# Patient Record
Sex: MALE | Race: BLACK OR AFRICAN AMERICAN | Employment: STUDENT | URBAN - METROPOLITAN AREA
[De-identification: names, ages, dates, MRNs, and addresses within clinical notes are randomized per-mention and may not be internally consistent; named-entity substitution may affect disease eponyms.]

---
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
## 2024-01-07 NOTE — H&P
UROLOGY PRE-OPERATIVE HISTORY & PHYSICAL      Molina Davila Patient Status:  Outpatient in a Bed    2005 MRN [id number]   Location [facility] SURGERY Attending Talha Khan MD   Hosp Day # 0 PCP Mary Webber     Primary Care Provider: Mary Webber     Procedure     CYSTOSCOPY, BLADDER BIOPSY, POSSIBLE TRANSURETHRAL RESECTION BLADDER TUMOR:     History of Present Illness:          Molina Davila is a 18 year old otherwise healthy male who presents for evaluation of pain with voiding and perineal pain with LUTS. Now found to have abnormal bladder mucosa here for biopsy/resection.     Patient presented recently for evaluation of several urologic issues over the past 4 months. He reports he initially had issues with urination; noticed weak stream, straining, frequency. AUA symptom score is 19/35. No previous trauma. Around the same time has been feeling discomfort in the perineum and testicles; vague dull pain; no aggravating factors. Was seen at an ED a1 and an US was done but he is unsure of the results. He has tried NSAIDS which does help; did not try scrotal support. Also had STI testing per PCP; this was all negative per [facility] encounter on 10/12/23.   Finally, he has noticed a rash that was initially red/scaly at the left base of penis along suprapubic region and in the perineum. He was given Clotramizole per PCP; the perineal rash resolved and the suprapubic rash has improved and is now a darker pigmentation than surrounding skin but is not red or scaly- not painful.  No history of scrotal or perineal trauma.   No systemic symptoms.   PVR 20cc  UA negative      At initial visit, we recommended dermatology for his skin rash. For LUTS-vikor testing was sent and negative.  We also recommended bowel regimen.   Scrotal US 23; small cysts on epididimal heads bilaterally. Mild left varicocele.   Given his LUTS and pain, presented for cystoscopy to rule out stricture or other pathology.       Cysto; 11/16/23;   Moderately enlarged for age without signs of obstruction, mildly high bladder neck, Significant bladder neck inflammation with bullous edema- uploaded to media- extrinsic compression from bowel constipation noted throughout bladder    Cytology- negative.      CTU 11/24/23;   Essentaially normal; large amount of constipation.     He followed up shortly after. We discussed his issues with ongoing constipation- quite severe. He passed small hard stool but feels he can't empty well. DOES feel his urinary symptoms are related to this. He has been on miralax for a few days which has helped. He has not yet seen dermatology for his rash as instructed.   Overall, his voiding symptoms and perineal pain has improved significantly since his initial presentation but he still requires pain medication and feels uncomfortable.   Previously pain was severe enough that he could not work/exercise but this has improved; now able to exercise without pain. Still bothered by his symptoms.     Extensive discussion had regarding management of abnormal tissue and patients severe LUTS (although this has been improving with time). There is a chance that this could be cystitis glandularis for which treatment is generally resection.    Presents now for resection/biopsy of abnormal tissue. Case was discussed with collegeues given abnormal presentation; plan for resection and then possible antibiotics, GI, PFPT with repeat cystoscopy in the future.   Patient still needs to see GI, dermatology.   We discussed no intervention if cystoscopy now shows resolution of his inflammation after the bowel regimen.     The risks of the above procedure were discussed at length with patient and grandmother including infection, bleeding, damage to surrounding organs, need for catheter, need for further surgery.     Plan after surgery:   - See GI  - See dermatology  - Bowel regimen  - Consider seeing PFPT     UCX NEGATIVE    History:     Past  Medical History:   Diagnosis Date    Anesthesia complication     Patient reports he was awake during wisom teeth revomal    Asthma        Past Surgical History:   Procedure Laterality Date    WISDOM TEETH REMOVED         Family History   Problem Relation Age of Onset    Diabetes Mother     Diabetes Maternal Grandmother     Stroke Maternal Grandfather     Diabetes Other         Maternal Uncle,Aunt    Heart Disease Neg        Social History     Socioeconomic History    Marital status: Single   Tobacco Use    Smoking status: Never    Smokeless tobacco: Never   Vaping Use    Vaping Use: Some days    Substances: Nicotine    Devices: Disposable   Substance and Sexual Activity    Alcohol use: No     Alcohol/week: 0.0 standard drinks of alcohol    Drug use: Yes     Types: Cannabis       Medications:  Current Outpatient Medications   Medication Sig Dispense Refill    polyethylene glycol, PEG 3350, 17 g Oral Powd Pack Take 17 g by mouth daily.      Albuterol Sulfate  (90 Base) MCG/ACT Inhalation Aero Soln       tamsulosin 0.4 MG Oral Cap Take 1 capsule (0.4 mg total) by mouth every evening. 90 capsule 6       Allergies:  Allergies   Allergen Reactions    Albumin, Egg OTHER (SEE COMMENTS)     Patient unsure if true allergy, states he eats eggs regularly       Review of Systems:   A comprehensive 10-point review of systems was completed.  Pertinent positives and negatives are noted in the the HPI.    Physical Exam:   Vital Signs:  Height 5' 11\" (1.803 m), weight 135 lb (61.2 kg).     CONSTITUTIONAL: Well developed, well nourished, in no acute distress   RESPIRATORY: Normal respiratory effort  ABDOMEN: Soft, non-tender, non-distended    Laboratory Data:  Lab Results   Component Value Date    WBC 5.82 07/24/2021    HGB 16.1 07/24/2021     07/24/2021     No results found for: \"NA\", \"K\", \"CL\", \"CO2\", \"BUN\", \"CREATININE\", \"GLU\", \"GFRAA\", \"AST\", \"ALT\", \"TP\", \"ALB\", \"PHOS\", \"CA\", \"MG\"    Urinalysis Results (last three  years):  Recent Labs     10/30/23  1123 12/07/23  1022 12/30/23  1037   COLORUR  --   --  Light-Yellow   CLARITY  --   --  Clear   SPECGRAVITY 1.025 1.020 1.025   PHURINE 6.0 7.5 7.0   PROUR  --   --  Trace*   GLUUR  --   --  Normal   KETUR  --   --  Negative   BILUR  --   --  Negative   BLOODURINE  --   --  Negative   NITRITE Negative Negative Negative   UROBILINOGEN  --   --  Normal   LEUUR  --   --  Negative       Urine Culture Results (last three years):  Lab Results   Component Value Date    URINECUL No Growth 2 Days 12/30/2023       PSA:  No results found for: \"PSA\", \"PERCENTPSA\", \"PSAS\", \"PSAULTRA\"     Imaging (last three days):  No results found.     Assessment:   Molina Davila is a 18 year old y/o male who presents for the above stated procedure.       Plan:     - OR for CYSTOSCOPY, BLADDER BIOPSY, POSSIBLE TRANSURETHRAL RESECTION BLADDER TUMOR:   - NPO since midnight   - Antibiotics ordered for OR   - Informed consent obtained - risks and benefits explained, and all questions answered  - Marked appropriately     I have personally reviewed all relevant medical records, labs, and imaging.       Talha Khan MD  Staff Urologist  [facility]  Office: [phone]

## 2024-01-08 ENCOUNTER — ANESTHESIA EVENT (OUTPATIENT)
Dept: SURGERY | Facility: HOSPITAL | Age: 19
End: 2024-01-08
Payer: MEDICAID

## 2024-01-09 ENCOUNTER — APPOINTMENT (OUTPATIENT)
Dept: GENERAL RADIOLOGY | Facility: HOSPITAL | Age: 19
End: 2024-01-09
Attending: UROLOGY
Payer: MEDICAID

## 2024-01-09 ENCOUNTER — ANESTHESIA (OUTPATIENT)
Dept: SURGERY | Facility: HOSPITAL | Age: 19
End: 2024-01-09
Payer: MEDICAID

## 2024-01-09 ENCOUNTER — HOSPITAL ENCOUNTER (OUTPATIENT)
Facility: HOSPITAL | Age: 19
Discharge: HOME | End: 2024-01-09
Attending: UROLOGY | Admitting: UROLOGY
Payer: MEDICAID

## 2024-01-09 VITALS
HEIGHT: 71 IN | WEIGHT: 131.63 LBS | DIASTOLIC BLOOD PRESSURE: 68 MMHG | BODY MASS INDEX: 18.43 KG/M2 | SYSTOLIC BLOOD PRESSURE: 110 MMHG | OXYGEN SATURATION: 100 % | RESPIRATION RATE: 18 BRPM | HEART RATE: 76 BPM | TEMPERATURE: 98 F

## 2024-01-09 DIAGNOSIS — N32.9 LESION OF BLADDER: Primary | ICD-10-CM

## 2024-01-09 PROCEDURE — 0T5B8ZZ DESTRUCTION OF BLADDER, VIA NATURAL OR ARTIFICIAL OPENING ENDOSCOPIC: ICD-10-PCS | Performed by: UROLOGY

## 2024-01-09 PROCEDURE — 0TBC8ZX EXCISION OF BLADDER NECK, VIA NATURAL OR ARTIFICIAL OPENING ENDOSCOPIC, DIAGNOSTIC: ICD-10-PCS | Performed by: UROLOGY

## 2024-01-09 PROCEDURE — 52214 CYSTOSCOPY AND TREATMENT: CPT | Performed by: UROLOGY

## 2024-01-09 PROCEDURE — 0TBB8ZX EXCISION OF BLADDER, VIA NATURAL OR ARTIFICIAL OPENING ENDOSCOPIC, DIAGNOSTIC: ICD-10-PCS | Performed by: UROLOGY

## 2024-01-09 RX ORDER — LIDOCAINE HYDROCHLORIDE 20 MG/ML
JELLY TOPICAL AS NEEDED
Status: DISCONTINUED | OUTPATIENT
Start: 2024-01-09 | End: 2024-01-09 | Stop reason: HOSPADM

## 2024-01-09 RX ORDER — ACETAMINOPHEN 500 MG
1000 TABLET ORAL ONCE
Status: DISCONTINUED | OUTPATIENT
Start: 2024-01-09 | End: 2024-01-09 | Stop reason: HOSPADM

## 2024-01-09 RX ORDER — ONDANSETRON 2 MG/ML
INJECTION INTRAMUSCULAR; INTRAVENOUS AS NEEDED
Status: DISCONTINUED | OUTPATIENT
Start: 2024-01-09 | End: 2024-01-09 | Stop reason: SURG

## 2024-01-09 RX ORDER — HYDROMORPHONE HYDROCHLORIDE 1 MG/ML
0.2 INJECTION, SOLUTION INTRAMUSCULAR; INTRAVENOUS; SUBCUTANEOUS EVERY 5 MIN PRN
Status: DISCONTINUED | OUTPATIENT
Start: 2024-01-09 | End: 2024-01-09

## 2024-01-09 RX ORDER — NALOXONE HYDROCHLORIDE 0.4 MG/ML
0.08 INJECTION, SOLUTION INTRAMUSCULAR; INTRAVENOUS; SUBCUTANEOUS AS NEEDED
Status: DISCONTINUED | OUTPATIENT
Start: 2024-01-09 | End: 2024-01-09

## 2024-01-09 RX ORDER — HYDROMORPHONE HYDROCHLORIDE 1 MG/ML
0.6 INJECTION, SOLUTION INTRAMUSCULAR; INTRAVENOUS; SUBCUTANEOUS EVERY 5 MIN PRN
Status: DISCONTINUED | OUTPATIENT
Start: 2024-01-09 | End: 2024-01-09

## 2024-01-09 RX ORDER — PHENYLEPHRINE HCL 10 MG/ML
VIAL (ML) INJECTION AS NEEDED
Status: DISCONTINUED | OUTPATIENT
Start: 2024-01-09 | End: 2024-01-09 | Stop reason: SURG

## 2024-01-09 RX ORDER — MEPERIDINE HYDROCHLORIDE 25 MG/ML
INJECTION INTRAMUSCULAR; INTRAVENOUS; SUBCUTANEOUS
Status: COMPLETED
Start: 2024-01-09 | End: 2024-01-09

## 2024-01-09 RX ORDER — HYDROCODONE BITARTRATE AND ACETAMINOPHEN 10; 325 MG/1; MG/1
1 TABLET ORAL ONCE AS NEEDED
Status: COMPLETED | OUTPATIENT
Start: 2024-01-09 | End: 2024-01-09

## 2024-01-09 RX ORDER — HYDROCODONE BITARTRATE AND ACETAMINOPHEN 10; 325 MG/1; MG/1
2 TABLET ORAL ONCE AS NEEDED
Status: COMPLETED | OUTPATIENT
Start: 2024-01-09 | End: 2024-01-09

## 2024-01-09 RX ORDER — SULFAMETHOXAZOLE AND TRIMETHOPRIM 800; 160 MG/1; MG/1
1 TABLET ORAL 2 TIMES DAILY
Qty: 14 TABLET | Refills: 0 | Status: SHIPPED | OUTPATIENT
Start: 2024-01-09 | End: 2024-01-16

## 2024-01-09 RX ORDER — KETOROLAC TROMETHAMINE 30 MG/ML
INJECTION, SOLUTION INTRAMUSCULAR; INTRAVENOUS AS NEEDED
Status: DISCONTINUED | OUTPATIENT
Start: 2024-01-09 | End: 2024-01-09 | Stop reason: SURG

## 2024-01-09 RX ORDER — MEPERIDINE HYDROCHLORIDE 25 MG/ML
12.5 INJECTION INTRAMUSCULAR; INTRAVENOUS; SUBCUTANEOUS AS NEEDED
Status: DISCONTINUED | OUTPATIENT
Start: 2024-01-09 | End: 2024-01-09

## 2024-01-09 RX ORDER — ONDANSETRON 2 MG/ML
4 INJECTION INTRAMUSCULAR; INTRAVENOUS EVERY 6 HOURS PRN
Status: DISCONTINUED | OUTPATIENT
Start: 2024-01-09 | End: 2024-01-09

## 2024-01-09 RX ORDER — CEFAZOLIN SODIUM/WATER 2 G/20 ML
2 SYRINGE (ML) INTRAVENOUS ONCE
Status: COMPLETED | OUTPATIENT
Start: 2024-01-09 | End: 2024-01-09

## 2024-01-09 RX ORDER — CEFAZOLIN SODIUM/WATER 2 G/20 ML
SYRINGE (ML) INTRAVENOUS
Status: DISCONTINUED
Start: 2024-01-09 | End: 2024-01-09

## 2024-01-09 RX ORDER — HYDROMORPHONE HYDROCHLORIDE 1 MG/ML
0.4 INJECTION, SOLUTION INTRAMUSCULAR; INTRAVENOUS; SUBCUTANEOUS EVERY 5 MIN PRN
Status: DISCONTINUED | OUTPATIENT
Start: 2024-01-09 | End: 2024-01-09

## 2024-01-09 RX ORDER — OXYBUTYNIN CHLORIDE 5 MG/1
5 TABLET ORAL 3 TIMES DAILY PRN
Qty: 15 TABLET | Refills: 0 | Status: SHIPPED | OUTPATIENT
Start: 2024-01-09

## 2024-01-09 RX ORDER — PROCHLORPERAZINE EDISYLATE 5 MG/ML
5 INJECTION INTRAMUSCULAR; INTRAVENOUS EVERY 8 HOURS PRN
Status: DISCONTINUED | OUTPATIENT
Start: 2024-01-09 | End: 2024-01-09

## 2024-01-09 RX ORDER — ACETAMINOPHEN 500 MG
1000 TABLET ORAL ONCE AS NEEDED
Status: COMPLETED | OUTPATIENT
Start: 2024-01-09 | End: 2024-01-09

## 2024-01-09 RX ORDER — PHENAZOPYRIDINE HYDROCHLORIDE 100 MG/1
100 TABLET, FILM COATED ORAL 3 TIMES DAILY PRN
Qty: 10 TABLET | Refills: 0 | Status: SHIPPED | OUTPATIENT
Start: 2024-01-09

## 2024-01-09 RX ORDER — DEXAMETHASONE SODIUM PHOSPHATE 4 MG/ML
VIAL (ML) INJECTION AS NEEDED
Status: DISCONTINUED | OUTPATIENT
Start: 2024-01-09 | End: 2024-01-09 | Stop reason: SURG

## 2024-01-09 RX ORDER — SODIUM CHLORIDE, SODIUM LACTATE, POTASSIUM CHLORIDE, CALCIUM CHLORIDE 600; 310; 30; 20 MG/100ML; MG/100ML; MG/100ML; MG/100ML
INJECTION, SOLUTION INTRAVENOUS CONTINUOUS
Status: DISCONTINUED | OUTPATIENT
Start: 2024-01-09 | End: 2024-01-09

## 2024-01-09 RX ORDER — MIDAZOLAM HYDROCHLORIDE 1 MG/ML
INJECTION INTRAMUSCULAR; INTRAVENOUS AS NEEDED
Status: DISCONTINUED | OUTPATIENT
Start: 2024-01-09 | End: 2024-01-09 | Stop reason: SURG

## 2024-01-09 RX ORDER — LIDOCAINE HYDROCHLORIDE 10 MG/ML
INJECTION, SOLUTION EPIDURAL; INFILTRATION; INTRACAUDAL; PERINEURAL AS NEEDED
Status: DISCONTINUED | OUTPATIENT
Start: 2024-01-09 | End: 2024-01-09 | Stop reason: SURG

## 2024-01-09 RX ORDER — MIDAZOLAM HYDROCHLORIDE 1 MG/ML
1 INJECTION INTRAMUSCULAR; INTRAVENOUS EVERY 5 MIN PRN
Status: DISCONTINUED | OUTPATIENT
Start: 2024-01-09 | End: 2024-01-09

## 2024-01-09 RX ADMIN — SODIUM CHLORIDE, SODIUM LACTATE, POTASSIUM CHLORIDE, CALCIUM CHLORIDE: 600; 310; 30; 20 INJECTION, SOLUTION INTRAVENOUS at 10:25:00

## 2024-01-09 RX ADMIN — LIDOCAINE HYDROCHLORIDE 100 MG: 10 INJECTION, SOLUTION EPIDURAL; INFILTRATION; INTRACAUDAL; PERINEURAL at 10:27:00

## 2024-01-09 RX ADMIN — PHENYLEPHRINE HCL 100 MCG: 10 MG/ML VIAL (ML) INJECTION at 10:41:00

## 2024-01-09 RX ADMIN — KETOROLAC TROMETHAMINE 30 MG: 30 INJECTION, SOLUTION INTRAMUSCULAR; INTRAVENOUS at 10:49:00

## 2024-01-09 RX ADMIN — MIDAZOLAM HYDROCHLORIDE 2 MG: 1 INJECTION INTRAMUSCULAR; INTRAVENOUS at 10:23:00

## 2024-01-09 RX ADMIN — CEFAZOLIN SODIUM/WATER 2 G: 2 G/20 ML SYRINGE (ML) INTRAVENOUS at 10:34:00

## 2024-01-09 RX ADMIN — ONDANSETRON 4 MG: 2 INJECTION INTRAMUSCULAR; INTRAVENOUS at 10:49:00

## 2024-01-09 RX ADMIN — PHENYLEPHRINE HCL 50 MCG: 10 MG/ML VIAL (ML) INJECTION at 10:52:00

## 2024-01-09 RX ADMIN — DEXAMETHASONE SODIUM PHOSPHATE 4 MG: 4 MG/ML VIAL (ML) INJECTION at 10:35:00

## 2024-01-09 NOTE — ANESTHESIA PROCEDURE NOTES
Airway  Date/Time: 1/9/2024 10:28 AM  Urgency: elective      General Information and Staff    Patient location during procedure: OR  Anesthesiologist: Red Maurer MD  Resident/CRNA: Tigist Herbert CRNA  Performed: CRNA   Performed by: Tigist Herbert CRNA  Authorized by: Red Maurer MD      Indications and Patient Condition  Indications for airway management: anesthesia  Sedation level: deep  Preoxygenated: yes  Patient position: sniffing  Mask difficulty assessment: 1 - vent by mask    Final Airway Details  Final airway type: supraglottic airway      Successful airway: classic  Size 4       Number of attempts at approach: 1

## 2024-01-09 NOTE — DISCHARGE INSTRUCTIONS
You had a transurethral resection of a bladder mass (TURBT) in the operating room today.    Instructions:    - No heavy lifting or strenuous activity for 1 weeks. You should continue walking and light activity daily.    - Make sure to avoid constipation. The goal is for 1-2 soft bowel movements without straining, every day for the next 4 weeks. You may take an over the counter medication like Miralax to help with this. Please stop this medication if you begin having frequent loose stools (diarrhea).     - WE will arrange for your catheter removal on Friday 1/12       - Your follow-up appointment is listed below. Please contact us at [phone] if you need to change your appointment.  We will discuss the results of your biopsy at this next appoitment.     Your appointments       Date & Time Appointment Department ([place])    Jan 17, 2024  3:30 PM CST Post Op Visit with Talha Khan MD [facility] ([facility] 4)        Jan 30, 2024 11:00 AM CST Consult with Damon Armstrong MD [facility] ([facility] 4  [address] [facility] [address]  [facility] [id number] [phone] [address]  [facility] [id number] [phone]              - You may experience mild pain after the procedure for a few days.  If the pain becomes intolerable please contact our office or go to the nearest Emergency Room or Urgent Care. You should take over the counter tylenol for mild pain, and can alternate with ibuprofen (AKA motrin, advil - provided you do not have a medical condition such as stomach ulcers or kidney disease which prohibits you from taking these) if pain persists. Next ibuprofen dose on or after 4:50pm. If pain is still not  relieved by tylenol and/or ibuprofen, you may take narcotic pain medication if prescribed (typically oxycodone or tramadol). If you are taking narcotic pain medication this can make you constipated, so you should take over the counter stool softeners or miralax if prescribed.    - Warm pack or hot baths often help with discomfort after cystoscopy.    - If you take blood thinners (such as aspirin or plavix) please hold these medications until 2 days after surgery    - Take the antiiotic for 7days        - You may experience burning and frequency of urination over the next few days. This will improve after a few days if you stay well hydrated. If you were prescribed phenazopyridine (Pyridium) this may relieve urinary discomfort but you can only take this for 3 days. Pyridium will make your urine orange.     - You are likely to see some blood in your urine (pink or light red urine) that should clear up within a few days. Staying well hydrated should help this clear up. If you notice the urine stays dark red or there are multiple large blood clots despite good hydration, please call the urology clinic ([phone]).     - Try to abstain from alcohol, coffee, tea, artificial sweeteners, and spicy food for the next 48 hours as these can irritate the bladder.    - Drink 1.5 to 2 liters of fluid today (water is preferable). If you are on a fluid restriction due to other medical reasons then you need to adhere to your fluid restriction recommendations.    - If you are discharged with a Colón catheter in place, you will be scheduled to return for removal. If you were prescribed Oxybutynin on discharge (to help with bladder spasms), you should stop taking this 24 hours before your follow-up appointment. If the Colón catheter stops draining, reposition yourself and check for kinks in the tubing. If this persists and you are not seeing any urine drain, and you notice bladder discomfort please call or go to the nearest emergency  department as soon as possible.     - If you develop fevers / chills, difficulty urinating, or severe abdominal pain please call the office or go to the nearest emergency department.       Talha Khan MD  Staff Urologist  [facility]  Office: [phone]

## 2024-01-09 NOTE — ANESTHESIA POSTPROCEDURE EVALUATION
[facility]    Molina Davila Patient Status:  Outpatient in a Bed   Age/Gender 18 year old male MRN [id number]   Location [facility] POST ANESTHESIA CARE UNIT Attending Talha Khan MD   Hosp Day # 0 PCP Mary Webber       Anesthesia Post-op Note    CYSTOSCOPY, BLADDER BIOPSY    Procedure Summary       Date: 01/09/24 Room / Location:  MAIN OR 07 / [facility] MAIN OR    Anesthesia Start: 1024 Anesthesia Stop: 1150    Procedure: CYSTOSCOPY, BLADDER BIOPSY (Bladder) Diagnosis:       Lesion of bladder      (Lesion of bladder [N32.9])    Surgeons: Talha Khan MD Anesthesiologist: Red Maurer MD    Anesthesia Type: general ASA Status: 1            Anesthesia Type: general    Vitals Value Taken Time   /94 01/09/24 1146   Temp 97.8 01/09/24 1151   Pulse 93 01/09/24 1151   Resp 23 01/09/24 1151   SpO2 96 % 01/09/24 1151   Vitals shown include unfiled device data.    Patient Location: PACU    Anesthesia Type: general    Airway Patency: patent    Postop Pain Control: adequate    Mental Status: preanesthetic baseline    Nausea/Vomiting: none    Cardiopulmonary/Hydration status: stable euvolemic    Complications: no apparent anesthesia related complications    Postop vital signs: stable    Dental Exam: Unchanged from Preop    Patient to be discharged from PACU when criteria met.

## 2024-01-09 NOTE — OPERATIVE REPORT
Urology Operative Note    Attending Surgeon: Talha Khan MD    Assistant Surgeon: None    Patient Name: Molina Davila    Date of Surgery: 1/9/2024    Preoperative Diagnosis: Bladder lesions    Postoperative Diagnosis: Same    Procedure Performed:   Cystoscopy, bladder biopsies, fulguration of trigone (total are ~5cm)    Indication:    Molina Davila is a 18 year old otherwise healthy male who presents for evaluation of pain with voiding and perineal pain with LUTS. Now found to have abnormal bladder mucosa here for biopsy/resection.      Patient presented recently for evaluation of several urologic issues over the past 4 months. He reports he initially had issues with urination; noticed weak stream, straining, frequency. AUA symptom score is 19/35. No previous trauma. Around the same time has been feeling discomfort in the perineum and testicles; vague dull pain; no aggravating factors. Was seen at an ED a10/2023 and an US was done but he is unsure of the results. He has tried NSAIDS which does help; did not try scrotal support. Also had STI testing per PCP; this was all negative per [facility] encounter on 10/12/23.   Finally, he has noticed a rash that was initially red/scaly at the left base of penis along suprapubic region and in the perineum. He was given Clotramizole per PCP; the perineal rash resolved and the suprapubic rash has improved and is now a darker pigmentation than surrounding skin but is not red or scaly- not painful.  No history of scrotal or perineal trauma.   No systemic symptoms.   PVR 20cc  UA negative      At initial visit, we recommended dermatology for his skin rash. For LUTS-vikor testing was sent and negative.  We also recommended bowel regimen.   Scrotal US 11/6/23; small cysts on epididimal heads bilaterally. Mild left varicocele.   Given his LUTS and pain, presented for cystoscopy to rule out stricture or other pathology.      Cysto; 11/16/23;   Moderately enlarged for age without  signs of obstruction, mildly high bladder neck, Significant bladder neck inflammation with bullous edema- uploaded to media- extrinsic compression from bowel constipation noted throughout bladder    Cytology- negative.      CTU 11/24/23;   Essentaially normal; large amount of constipation.      He followed up shortly after. We discussed his issues with ongoing constipation- quite severe. He passed small hard stool but feels he can't empty well. DOES feel his urinary symptoms are related to this. He has been on miralax for a few days which has helped. He has not yet seen dermatology for his rash as instructed.   Overall, his voiding symptoms and perineal pain has improved significantly since his initial presentation but he still requires pain medication and feels uncomfortable.   Previously pain was severe enough that he could not work/exercise but this has improved; now able to exercise without pain. Still bothered by his symptoms.      Extensive discussion had regarding management of abnormal tissue and patients severe LUTS (although this has been improving with time). There is a chance that this could be cystitis glandularis for which treatment is generally resection.    Presents now for resection/biopsy of abnormal tissue. Case was discussed with collegeues given abnormal presentation; plan for resection and then possible antibiotics, GI, PFPT with repeat cystoscopy in the future.   Patient still needs to see GI, dermatology.   We discussed no intervention if cystoscopy now shows resolution of his inflammation after the bowel regimen.      The risks of the above procedure were discussed at length with patient and grandmother including infection, bleeding, damage to surrounding organs, need for catheter, need for further surgery.        Findings:  Narrow urethra. Prostate slightly obstructive, hypervascular. High bladder neck. Bladder with large amount of bullous edema and inflammatory changes along the bladder  neck from 4 o clock to 8 o clock; also edema at the trigone and near both ureteral orifices. Several biopsies were taken at the bladder neck and trigone with cold cup forceps.  Biopsy areas fulgurated.  All areas of inflammation in edema near the bladder neck and trigone were fulgurated.  The edema closest to the ureteral orifices was not fulgurated to ensure no ureteral orifice injury.  Prostate was quite hypervascular as well, this was fulgurated.  Hemostasis was good.  Catheter was placed due to extensive migration at the bladder neck.    Procedure:  The patient was taken to the operating room and a timeout was performed confirming the correct patient and procedure. The patient was prepped and draped in lithotomy position after undergoing general anesthesia. Pre-operative prophylactic antibiotics were given in the form of Ancef.    A 22 [place] Islander cystoscope was placed into the urethra and brought to the level of the bladder.  There was inflammatory changes and bullous edema along the bladder neck from the 4:00 to the 8 o'clock position.  There was also some inflammation at the trigone and near the bilateral ureteral orifices.  The inflammation at the bladder neck and trigone was biopsied with the flexible biopsy forceps. Several samples were taken and sent for pathology.   The Bugbee electrode was then used for hemostasis.  I also fulgurated the other areas of edema and inflammation.  Care was taken to not involve the bilateral ureteral orifices, so the edema overlying this area was not fulgurated.  All other areas of suspicion were fulgurated. The prostate had some mild oozing, spot fulguration was used to control this.   Hemostasis was excellent. The cystoscope was then removed.   A 20fr Colón catheter was placed into the bladder.     The patient was awoken from anesthesia and transferred to PACU in stable condition. The patient tolerated the procedure well. All instrument/supply counts were correct at the end of  the case.    Specimens:   Bladder lesion     Estimated Blood Loss:  5 mL    Tubes/Drains:  20fr Colón catheter    Complications:   None immediate    Condition from OR:  Stable    Plan:   - DC home  - abx x7 days  - Fu with GI and derm as scheduled  - Void trial Friday 1/12  - Path discussion next week as scheduled      Talha Khan MD  Staff Urologist  [facility]  Office: [phone]

## 2024-01-09 NOTE — ANESTHESIA PREPROCEDURE EVALUATION
PRE-OP EVALUATION    Patient Name: Molina Davila    Admit Diagnosis: Lesion of bladder [N32.9]    Pre-op Diagnosis: Lesion of bladder [N32.9]    CYSTOSCOPY, BLADDER BIOPSY, POSSIBLE TRANSURETHRAL RESECTION BLADDER TUMOR    Anesthesia Procedure: CYSTOSCOPY, BLADDER BIOPSY, POSSIBLE TRANSURETHRAL RESECTION BLADDER TUMOR    Surgeon(s) and Role:     * Talha Khan MD - Primary    Pre-op vitals reviewed.  Temp: 98.1 °F (36.7 °C)  Pulse: 79  Resp: 18  BP: 120/66  SpO2: 100 %  Body mass index is 18.35 kg/m².    Current medications reviewed.  Hospital Medications:   acetaminophen (Tylenol Extra Strength) tab 1,000 mg  1,000 mg Oral Once    lactated ringers infusion   Intravenous Continuous    ceFAZolin (Ancef) 2 g in 20mL IV syringe premix  2 g Intravenous Once    ceFAZolin (Ancef) 2 g/20mL IV syringe premix           Outpatient Medications:     Facility-Administered Medications Prior to Admission   Medication Dose Route Frequency Provider Last Rate Last Admin    [COMPLETED] ciprofloxacin (Cipro) tab 500 mg  500 mg Oral Once Talha Khan MD   500 mg at 11/16/23 1045     Medications Prior to Admission   Medication Sig Dispense Refill Last Dose    polyethylene glycol, PEG 3350, 17 g Oral Powd Pack Take 17 g by mouth daily.       Albuterol Sulfate  (90 Base) MCG/ACT Inhalation Aero Soln        tamsulosin 0.4 MG Oral Cap Take 1 capsule (0.4 mg total) by mouth every evening. 90 capsule 6 11/29/2023       Allergies: Albumin, egg      Anesthesia Evaluation    Patient summary reviewed.    Anesthetic Complications  (-) history of anesthetic complications         GI/Hepatic/Renal  Comment: Dysuria who presents for cystoscopy                               Cardiovascular                                                       Endo/Other    Negative endo/other ROS.                              Pulmonary  Comment: No recent asthma problems  not taking any meds for asthma    (+) asthma                      Neuro/Psych  Comment: ADHD  Negative neuro/psych ROS.                                  Past Surgical History:   Procedure Laterality Date    WISDOM TEETH REMOVED       Social History     Socioeconomic History    Marital status: Single   Tobacco Use    Smoking status: Never    Smokeless tobacco: Never   Vaping Use    Vaping Use: Some days    Substances: Nicotine    Devices: Disposable   Substance and Sexual Activity    Alcohol use: No     Alcohol/week: 0.0 standard drinks of alcohol    Drug use: Yes     Types: Cannabis     History   Drug Use    Types: Cannabis     Available pre-op labs reviewed.               Airway      Mallampati: II  Mouth opening: >3 FB  TM distance: > 6 cm  Neck ROM: full Cardiovascular    Cardiovascular exam normal.  Rhythm: regular  Rate: normal  (-) murmur   Dental    Dentition appears grossly intact         Pulmonary    Pulmonary exam normal.  Breath sounds clear to auscultation bilaterally.               Other findings              ASA: 1   Plan: general  NPO status verified and patient meets guidelines.        Comment: GA with OETT/LMA explained to patient. Risks/benefits explained including but not limited to sore throat, hoarse voice, nausea, dental trauma, eye injury,pulmonary complication and other complications as discussed in anesthesia consent form. Patient agrees to proceed. Anesthesia consent signed.     Plan/risks discussed with: patient, father and mother                Present on Admission:  **None**

## 2024-01-10 ENCOUNTER — TELEPHONE (OUTPATIENT)
Dept: SURGERY | Facility: CLINIC | Age: 19
End: 2024-01-10

## 2024-01-10 NOTE — TELEPHONE ENCOUNTER
This encounter is now closed.     RN called patient. Requesting to have wray removed. MD made aware. Ok to have it removed Thursday.   S/P bladder bx on 1/9  Nurse visit at Thursday, 1/11 10 AM.   Instruction/directions given.  Agreed to plans and verbalized understanding-------

## 2024-01-10 NOTE — TELEPHONE ENCOUNTER
Per pt states catheter is causing too much pain, asking if it can be removed tomorrow. Please advise thank you.

## 2024-01-10 NOTE — TELEPHONE ENCOUNTER
Per pt the time for wray removal does not work for him and he is asking to reschedule. Please advise

## 2024-01-11 ENCOUNTER — NURSE ONLY (OUTPATIENT)
Dept: SURGERY | Facility: CLINIC | Age: 19
End: 2024-01-11

## 2024-01-11 DIAGNOSIS — Z98.890 STATUS POST SURGERY: Primary | ICD-10-CM

## 2024-01-11 NOTE — PROGRESS NOTES
Saw patient during void trial. Discussed pathology, was as expected. Will discuss more detail next week.     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

## 2024-01-11 NOTE — PROGRESS NOTES
Patient here with his Uncle for Colón removal. S/P bladder biopsy on 1/9.     Steps of the procedure explained. He verbalized understanding and agreeable to plans. Patient lowered his pants and undergarments, and laid down on a comfortable position. Genital area observed to clean and dry. Donned gloves. Chux on patient's thigh. Untangled the straps. Urine on the bag is pink and no clots observed. With the empty syringe, deflated the balloon of his entire content of 10cc. Removed 20Fr Colón along with his leg bag. Tolerated the removal of Colón. Chux and syringes were disposed accordingly.    Provided post Colón cath instructions. He agreed to plans and verbalized understanding.

## 2024-01-17 ENCOUNTER — OFFICE VISIT (OUTPATIENT)
Dept: SURGERY | Facility: CLINIC | Age: 19
End: 2024-01-17

## 2024-01-17 DIAGNOSIS — N30.80 CYSTITIS GLANDULARIS: Primary | ICD-10-CM

## 2024-01-17 PROCEDURE — 99214 OFFICE O/P EST MOD 30 MIN: CPT | Performed by: UROLOGY

## 2024-01-17 NOTE — PROGRESS NOTES
Urology Clinic Note    Primary Care Provider:  Mary Webber     Chief Complaint:   Cystitis glandularis    HPI:     Molina Davila is a 19 year old otherwise healthy male who presents for evaluation of pain with voiding and perineal pain with LUTS. Now found to have abnormal bladder mucosa sp TURBT on 1/9/24; path with cystitis glandularis.      Patient presented recently for evaluation of several urologic issues over the past 4 months. He reports he initially had issues with urination; noticed weak stream, straining, frequency. AUA symptom score is 19/35. No previous trauma. Around the same time has been feeling discomfort in the perineum and testicles; vague dull pain; no aggravating factors. Was seen at an ED a10/2023 and an US was done but he is unsure of the results. He has tried NSAIDS which does help; did not try scrotal support. Also had STI testing per PCP; this was all negative per [facility] encounter on 10/12/23.   Finally, he has noticed a rash that was initially red/scaly at the left base of penis along suprapubic region and in the perineum. He was given Clotramizole per PCP; the perineal rash resolved and the suprapubic rash has improved and is now a darker pigmentation than surrounding skin but is not red or scaly- not painful.  No history of scrotal or perineal trauma.   No systemic symptoms.   PVR 20cc  UA negative      At initial visit, we recommended dermatology for his skin rash. For LUTS-vikor testing was sent and negative.  We also recommended bowel regimen.   Scrotal US 11/6/23; small cysts on epididimal heads bilaterally. Mild left varicocele.   Given his LUTS and pain, presented for cystoscopy to rule out stricture or other pathology.      Cysto; 11/16/23;   Moderately enlarged for age without signs of obstruction, mildly high bladder neck, Significant bladder neck inflammation with bullous edema- uploaded to media- extrinsic compression from bowel constipation noted throughout bladder     Cytology- negative.      CTU 11/24/23;   Essentaially normal; large amount of constipation.      He followed up shortly after. We discussed his issues with ongoing constipation- quite severe. He passed small hard stool but feels he can't empty well. DOES feel his urinary symptoms are related to this. He has been on miralax for a few days which has helped. He has not yet seen dermatology for his rash as instructed.   Overall, his voiding symptoms and perineal pain has improved significantly since his initial presentation but he still requires pain medication and feels uncomfortable.   Previously pain was severe enough that he could not work/exercise but this has improved; now able to exercise without pain. Still bothered by his symptoms.      Extensive discussion had regarding management of abnormal tissue and patients severe LUTS (although this has been improving with time). There is a chance that this could be cystitis glandularis for which treatment is generally resection.    Case was discussed with corine given abnormal presentation; plan was for resection and then antibiotics, GI, PFPT with repeat cystoscopy in the future.     He presented for TURBT on 1/9/23;   Narrow urethra. Prostate slightly obstructive, hypervascular. High bladder neck. Bladder with large amount of bullous edema and inflammatory changes along the bladder neck from 4 o clock to 8 o clock; also edema at the trigone and near both ureteral orifices. Several biopsies were taken at the bladder neck and trigone with cold cup forceps.  Biopsy areas fulgurated.  All areas of inflammation in edema near the bladder neck and trigone were fulgurated.  The edema closest to the ureteral orifices was not fulgurated to ensure no ureteral orifice injury.  Prostate was quite hypervascular as well, this was fulgurated.  Hemostasis was good.  Catheter was placed due to extensive migration at the bladder neck.    Doing well. Catheter was removed on POD1  and he is voiding without issue.   Path reviewed; benign findings likely related to his constipation.   He states he is urinating well.  Hematuria has improved after surgery.  His constipation continues to improve while on the MiraLAX.  He is seeing dermatology next week for his rash, GI at the end of the month for his constipation.  He reports almost complete resolution in his bladder and voiding symptoms.  Does still have a small suprapubic rash, however he now has an itchy and scaly rash behind his right ear.  Of note, previously steroids have helped these rashes.  He has a history of food allergies but no asthma.      PSA:  No results found for: \"PSA\", \"PERCENTPSA\", \"PSAS\", \"PSAULTRA\", \"QPSA\", \"PSATOT\", \"TOTPSADX\", \"TOTPSASCREEN\"     History:     Past Medical History:   Diagnosis Date    Anesthesia complication     Patient reports he was awake during wisom teeth revomal    Asthma        Past Surgical History:   Procedure Laterality Date    WISDOM TEETH REMOVED         Family History   Problem Relation Age of Onset    Diabetes Mother     Diabetes Maternal Grandmother     Stroke Maternal Grandfather     Diabetes Other         Maternal Uncle,Aunt    Heart Disease Neg        Social History     Socioeconomic History    Marital status: Single   Tobacco Use    Smoking status: Never    Smokeless tobacco: Never   Vaping Use    Vaping Use: Some days    Substances: Nicotine    Devices: Disposable   Substance and Sexual Activity    Alcohol use: No     Alcohol/week: 0.0 standard drinks of alcohol    Drug use: Yes     Types: Cannabis       Medications (Active prior to today's visit):  Current Outpatient Medications   Medication Sig Dispense Refill    phenazopyridine (PYRIDIUM) 100 MG Oral Tab Take 1 tablet (100 mg total) by mouth 3 (three) times daily as needed for Pain. This will turn your urine orange. 10 tablet 0    oxybutynin 5 MG Oral Tab Take 1 tablet (5 mg total) by mouth 3 (three) times daily as needed (Bladder  spasms). Stop 12 hours before Colón catheter removal in clinic (if applicable) 15 tablet 0    polyethylene glycol, PEG 3350, 17 g Oral Powd Pack Take 17 g by mouth daily.      tamsulosin 0.4 MG Oral Cap Take 1 capsule (0.4 mg total) by mouth every evening. 90 capsule 6    Albuterol Sulfate  (90 Base) MCG/ACT Inhalation Aero Soln          Allergies:  Allergies   Allergen Reactions    Albumin, Egg OTHER (SEE COMMENTS)     Patient unsure if true allergy, states he eats eggs regularly       Review of Systems:   A comprehensive 10-point review of systems was completed.  Pertinent positives and negatives are noted in the the HPI.    Physical Exam:   CONSTITUTIONAL: Well developed, well nourished, in no acute distress  NEUROLOGIC: Alert and oriented  HEAD: Normocephalic, atraumatic- small scaly rash behind right ear   SKIN: No evident rashes  ABDOMEN: Soft, non-tender, non-distended  GENITOURINARY: Normal phallus, orthotopic meatus, normal bilateral testicles; small dark pigmentation on left side suprapubic (stable)    Assessment & Plan:     Molina Davila is a 19 year old otherwise healthy male who presents for evaluation of pain with voiding and perineal pain with LUTS. Now found to have abnormal bladder mucosa sp TURBT on 1/9/24; path with cystitis glandularis.      - Discussed patients care at length. Discussed the diagnosis of cystitis cystica and glandularis and its relation to chronic inflammation/irritation. We discussed that the link between this and malignancy has been debated but most recent data suggests this is a benign entity and no longer considered pre-malignant. We discussed that there is some evidence for use of REDDY- 2 inhibitors or oral steroids in management as well; however given patients symptoms are much better and his cystoscopic findings were improved between his initial cystoscopy and TURBT; we decided on observation with repeat cystoscopy in 3 months. If persistent lesions would plan  for medical therapy.   In the meantime, he will follow up with GI. He will start PFPT. He will also complete his antibiotics course. Finally, we discussed his skin findings which are likely related to eczema; he will see dermatology for this.   Again stressed bowel regimen, timed voiding.      RTC 3mo for cystoscopy     In total, 30 minutes were spent on this patient encounter (including chart review, patient history, physical, and counseling, documentation, and communication).    Talha Khan MD  Staff Urologist  [facility]  Office: [phone]

## 2024-01-23 ENCOUNTER — APPOINTMENT (OUTPATIENT)
Dept: URBAN - METROPOLITAN AREA CLINIC 249 | Age: 19
Setting detail: DERMATOLOGY
End: 2024-01-23

## 2024-01-23 DIAGNOSIS — L28.0 LICHEN SIMPLEX CHRONICUS: ICD-10-CM

## 2024-01-23 DIAGNOSIS — R21 RASH AND OTHER NONSPECIFIC SKIN ERUPTION: ICD-10-CM

## 2024-01-23 PROCEDURE — OTHER OTHER: OTHER

## 2024-01-23 PROCEDURE — 99203 OFFICE O/P NEW LOW 30 MIN: CPT

## 2024-01-23 PROCEDURE — OTHER MIPS QUALITY: OTHER

## 2024-01-23 PROCEDURE — OTHER PRESCRIPTION: OTHER

## 2024-01-23 PROCEDURE — OTHER COUNSELING: OTHER

## 2024-01-23 RX ORDER — TRIAMCINOLONE ACETONIDE 0.25 MG/G
CREAM TOPICAL
Qty: 80 | Refills: 1 | Status: ERX | COMMUNITY
Start: 2024-01-23

## 2024-01-23 RX ORDER — KETOCONAZOLE 20 MG/G
CREAM TOPICAL
Qty: 60 | Refills: 2 | Status: ERX | COMMUNITY
Start: 2024-01-23

## 2024-01-23 ASSESSMENT — LOCATION SIMPLE DESCRIPTION DERM
LOCATION SIMPLE: GROIN
LOCATION SIMPLE: POSTERIOR SCALP

## 2024-01-23 ASSESSMENT — LOCATION ZONE DERM
LOCATION ZONE: TRUNK
LOCATION ZONE: SCALP

## 2024-01-23 ASSESSMENT — LOCATION DETAILED DESCRIPTION DERM
LOCATION DETAILED: SUPRAPUBIC SKIN
LOCATION DETAILED: RIGHT INFERIOR POSTAURICULAR SKIN

## 2024-01-23 ASSESSMENT — SEVERITY ASSESSMENT: SEVERITY: MILD

## 2024-01-23 ASSESSMENT — BSA RASH: BSA RASH: 6

## 2024-01-23 NOTE — PROCEDURE: OTHER
Render Risk Assessment In Note?: no
Detail Level: Zone
Note Text (......Xxx Chief Complaint.): This diagnosis correlates with the
Other (Free Text): Rash in groin was treated before coming here with clotrimazole and hydrocortisone. Today there appears to be post inflammatory hyperpigmentation only. Will prescribe Ketoconazole cream and TAC for flares.

## 2024-01-30 ENCOUNTER — HOSPITAL ENCOUNTER (OUTPATIENT)
Dept: GENERAL RADIOLOGY | Facility: HOSPITAL | Age: 19
Discharge: HOME | End: 2024-01-30
Attending: STUDENT IN AN ORGANIZED HEALTH CARE EDUCATION/TRAINING PROGRAM
Payer: MEDICAID

## 2024-01-30 ENCOUNTER — TELEPHONE (OUTPATIENT)
Facility: CLINIC | Age: 19
End: 2024-01-30

## 2024-01-30 ENCOUNTER — OFFICE VISIT (OUTPATIENT)
Facility: CLINIC | Age: 19
End: 2024-01-30

## 2024-01-30 ENCOUNTER — LAB ENCOUNTER (OUTPATIENT)
Dept: LAB | Facility: HOSPITAL | Age: 19
End: 2024-01-30
Attending: STUDENT IN AN ORGANIZED HEALTH CARE EDUCATION/TRAINING PROGRAM
Payer: MEDICAID

## 2024-01-30 VITALS
WEIGHT: 126 LBS | HEIGHT: 71 IN | HEART RATE: 68 BPM | BODY MASS INDEX: 17.64 KG/M2 | DIASTOLIC BLOOD PRESSURE: 72 MMHG | SYSTOLIC BLOOD PRESSURE: 111 MMHG

## 2024-01-30 DIAGNOSIS — K59.04 CHRONIC IDIOPATHIC CONSTIPATION: ICD-10-CM

## 2024-01-30 DIAGNOSIS — K59.04 CHRONIC IDIOPATHIC CONSTIPATION: Primary | ICD-10-CM

## 2024-01-30 LAB
ALBUMIN SERPL-MCNC: 4.6 G/DL (ref 3.2–4.8)
ALBUMIN/GLOB SERPL: 1.7 {RATIO} (ref 1–2)
ALP LIVER SERPL-CCNC: 74 U/L
ALT SERPL-CCNC: <7 U/L
ANION GAP SERPL CALC-SCNC: 7 MMOL/L (ref 0–18)
AST SERPL-CCNC: 16 U/L (ref ?–34)
BILIRUB SERPL-MCNC: 0.4 MG/DL (ref 0.3–1.2)
BUN BLD-MCNC: 16 MG/DL (ref 9–23)
BUN/CREAT SERPL: 15.2 (ref 10–20)
CALCIUM BLD-MCNC: 9.5 MG/DL (ref 8.7–10.4)
CHLORIDE SERPL-SCNC: 109 MMOL/L (ref 98–112)
CO2 SERPL-SCNC: 27 MMOL/L (ref 21–32)
CREAT BLD-MCNC: 1.05 MG/DL
DEPRECATED RDW RBC AUTO: 42.7 FL (ref 35.1–46.3)
EGFRCR SERPLBLD CKD-EPI 2021: 104 ML/MIN/1.73M2 (ref 60–?)
ERYTHROCYTE [DISTWIDTH] IN BLOOD BY AUTOMATED COUNT: 13.9 % (ref 11–15)
FASTING STATUS PATIENT QL REPORTED: YES
GLOBULIN PLAS-MCNC: 2.7 G/DL (ref 2.8–4.4)
GLUCOSE BLD-MCNC: 90 MG/DL (ref 70–99)
HCT VFR BLD AUTO: 41.3 %
HGB BLD-MCNC: 14.1 G/DL
MCH RBC QN AUTO: 28.8 PG (ref 26–34)
MCHC RBC AUTO-ENTMCNC: 34.1 G/DL (ref 31–37)
MCV RBC AUTO: 84.3 FL
OSMOLALITY SERPL CALC.SUM OF ELEC: 297 MOSM/KG (ref 275–295)
PLATELET # BLD AUTO: 389 10(3)UL (ref 150–450)
POTASSIUM SERPL-SCNC: 3.6 MMOL/L (ref 3.5–5.1)
PROT SERPL-MCNC: 7.3 G/DL (ref 5.7–8.2)
RBC # BLD AUTO: 4.9 X10(6)UL
SODIUM SERPL-SCNC: 143 MMOL/L (ref 136–145)
TSI SER-ACNC: 0.81 MIU/ML (ref 0.55–4.78)
WBC # BLD AUTO: 5.8 X10(3) UL (ref 4–11)

## 2024-01-30 PROCEDURE — 74018 RADEX ABDOMEN 1 VIEW: CPT | Performed by: STUDENT IN AN ORGANIZED HEALTH CARE EDUCATION/TRAINING PROGRAM

## 2024-01-30 PROCEDURE — 3078F DIAST BP <80 MM HG: CPT | Performed by: STUDENT IN AN ORGANIZED HEALTH CARE EDUCATION/TRAINING PROGRAM

## 2024-01-30 PROCEDURE — 84443 ASSAY THYROID STIM HORMONE: CPT

## 2024-01-30 PROCEDURE — 36415 COLL VENOUS BLD VENIPUNCTURE: CPT

## 2024-01-30 PROCEDURE — 85027 COMPLETE CBC AUTOMATED: CPT

## 2024-01-30 PROCEDURE — 3008F BODY MASS INDEX DOCD: CPT | Performed by: STUDENT IN AN ORGANIZED HEALTH CARE EDUCATION/TRAINING PROGRAM

## 2024-01-30 PROCEDURE — 3074F SYST BP LT 130 MM HG: CPT | Performed by: STUDENT IN AN ORGANIZED HEALTH CARE EDUCATION/TRAINING PROGRAM

## 2024-01-30 PROCEDURE — 99204 OFFICE O/P NEW MOD 45 MIN: CPT | Performed by: STUDENT IN AN ORGANIZED HEALTH CARE EDUCATION/TRAINING PROGRAM

## 2024-01-30 PROCEDURE — 80053 COMPREHEN METABOLIC PANEL: CPT

## 2024-01-30 RX ORDER — KETOCONAZOLE 20 MG/G
CREAM TOPICAL
COMMUNITY
Start: 2024-01-23

## 2024-01-30 NOTE — PATIENT INSTRUCTIONS
1) Continue alternating between benefiber and miralax.    2) Get labs checked today.    3) Get xray today.

## 2024-01-30 NOTE — TELEPHONE ENCOUNTER
Called patient to discuss x-ray results, he did not answer the phone and had a voicemail box that was not set up.    His KUB shows a rather large pancolonic stool burden and I do think this is contributing to his symptoms of bloating.    I do not believe that gentle laxatives will clear this burden.    I was going to leave a message and let the patient know that I think the next best step in management for him would be a bowel purge with 4 L of GoLytely followed by starting daily MiraLAX the following day.    Unfortunately, I was unable leave a voicemail on his phone.    If he calls back, please relay the above information.

## 2024-01-30 NOTE — H&P
[facility] - Gastroenterology                                                                                                               Reason for consult: constipation    Requesting physician or provider: Mary Webber    Chief Complaint   Patient presents with    Constipation     HPI:   Molina Davila is a 19 year old year-old man with history of chronic bladder inflammation who presents to GI tract given concern for constipation contributing to urinary complaints.    The patient reports chronic issues of abdominal bloating and constipation.  He reports straining with bowel movements passing hard/pellet-like stools.  He previously had a poor diet and he felt this is contributing to his symptoms.  More recently has tried to make better dietary choices and started taking intermittent fiber/MiraLAX.  When he takes MiraLAX or fiber he reports improvement in his bowel habits.    He did have a CT urogram done November 2023 that was normal for a large amount of stool throughout the colon.  Since then he reports feeling better.  He is having more regular/well-formed bowel movements and no longer feels the bloating sensation he had previously.    No family history of colon cancer.  No blood in the stool.    Prior endoscopies:  none    Soc:  -yes smoking  -no Etoh    Wt Readings from Last 6 Encounters:   01/30/24 126 lb (57.2 kg)   01/09/24 131 lb 9.6 oz (59.7 kg) (13%, Z= -1.12)*   09/15/21 132 lb 9.6 oz (60.1 kg) (26%, Z= -0.64)*   09/07/21 135 lb (61.2 kg) (30%, Z= -0.52)*   07/24/21 129 lb 8 oz (58.7 kg) (22%, Z= -0.76)*   06/12/21 128 lb 6.4 oz (58.2 kg) (22%, Z= -0.79)*     * Growth percentiles are based on CDC (Boys, 2-20 Years) data.        History, Medications, Allergies, ROS:      Past Medical History:   Diagnosis Date    Anesthesia complication     Patient reports he was awake during wisom teeth revomal     Asthma       Past Surgical History:   Procedure Laterality Date    WISDOM TEETH REMOVED        Family Hx:   Family History   Problem Relation Age of Onset    Diabetes Mother     Diabetes Maternal Grandmother     Stroke Maternal Grandfather     Diabetes Other         Maternal Uncle,Aunt    Heart Disease Neg       Social History:   Social History     Socioeconomic History    Marital status: Single   Tobacco Use    Smoking status: Never    Smokeless tobacco: Never   Vaping Use    Vaping Use: Some days    Substances: Nicotine    Devices: Disposable   Substance and Sexual Activity    Alcohol use: No     Alcohol/week: 0.0 standard drinks of alcohol    Drug use: Yes     Types: Cannabis        Medications (Active prior to today's visit):  Current Outpatient Medications   Medication Sig Dispense Refill    ketoconazole 2 % External Cream APPLY TOPICALLY TO RASH ON GROIN EVERY DAY IN THE EVENING FOR 3 TO 4 WEEKS. REPEAT AS NEEDED AT ONSET OF FLARES      Albuterol Sulfate  (90 Base) MCG/ACT Inhalation Aero Soln       phenazopyridine (PYRIDIUM) 100 MG Oral Tab Take 1 tablet (100 mg total) by mouth 3 (three) times daily as needed for Pain. This will turn your urine orange. (Patient not taking: Reported on 1/30/2024) 10 tablet 0    oxybutynin 5 MG Oral Tab Take 1 tablet (5 mg total) by mouth 3 (three) times daily as needed (Bladder spasms). Stop 12 hours before Colón catheter removal in clinic (if applicable) (Patient not taking: Reported on 1/30/2024) 15 tablet 0    polyethylene glycol, PEG 3350, 17 g Oral Powd Pack Take 17 g by mouth daily. (Patient not taking: Reported on 1/30/2024)      tamsulosin 0.4 MG Oral Cap Take 1 capsule (0.4 mg total) by mouth every evening. (Patient not taking: Reported on 1/30/2024) 90 capsule 6       Allergies:  Allergies   Allergen Reactions    Albumin, Egg OTHER (SEE COMMENTS)     Patient unsure if true allergy, states he eats eggs regularly       ROS:   CONSTITUTIONAL:  negative for  fevers, rigors  EYES:  negative for diplopia   RESPIRATORY:  negative for severe shortness of breath  CARDIOVASCULAR:  negative for crushing sub-sternal chest pain  GASTROINTESTINAL:  see HPI  GENITOURINARY:  negative for dysuria or gross hematuria  INTEGUMENT/BREAST:  SKIN:  negative for jaundice   ALLERGIC/IMMUNOLOGIC:  negative for hay fever  ENDOCRINE:  negative for cold intolerance and heat intolerance  MUSCULOSKELETAL:  negative for joint effusion/severe erythema  BEHAVIOR/PSYCH:  negative for psychotic behavior      PHYSICAL EXAM:   Blood pressure 111/72, pulse 68, height 5' 11\" (1.803 m), weight 126 lb (57.2 kg).    Gen- Patient appears comfortable and in no acute discomfort  HEENT: the sclera appears anicteric, oropharynx clear, mucus membranes appear moist  CV- regular rate and rhythm, the extremities are warm and well perfused   Lung- Moves air well; No labored breathing  Abdomen- soft, non-tender exam in all quadrants without rigidity or guarding, non-distended  Skin- No jaundice  Ext: no edema is evident.   Neuro- Alert and interactive, and gross movements of extremities normal  Psych - appropriate, non-agitated    Labs/Imaging:     Patient's pertinent labs and imaging were reviewed and discussed with patient today.      ASSESSMENT/PLAN:   Molina Davila is a 19 year old year-old man with history of chronic bladder inflammation who presents to GI tract given concern for constipation contributing to urinary complaints.    #Constipation  Likely slowed colonic transit. He has had issues with constipation for > 10 years now. Recent change in diet and increased hydration has improved his bowel habits. He previously strained and passed hard/pellet like stools. Recently this has been improved.    No blood in stool. No family hx of colon cancer.    Will check basic labs and treat more aggressively for constipation. Will consider a bowel purge pending KUB.    Recommendations:  1) Continue alternating  between benefiber and miralax.    2) Get labs checked today.    3) Get xray today.    Orders This Visit:  No orders of the defined types were placed in this encounter.    Meds This Visit:  Requested Prescriptions      No prescriptions requested or ordered in this encounter     Imaging & Referrals:  None         Damon Armstrong MD  [facility]  1/30/2024      This note was partially prepared using Dragon Medical voice recognition dictation software. As a result, errors may occur. When identified, these errors have been corrected. While every attempt is made to correct errors during dictation, discrepancies may still exist.

## 2024-02-02 NOTE — PROGRESS NOTES
Labs look good. Liver function, TSH and CBC fine.    Mychart sent to patient.    Starting bowel regimen for slowed transit constipation.

## 2024-02-08 NOTE — PROGRESS NOTES
MUSCULOSKELETAL AND PELVIC FLOOR EVALUATION:     Diagnosis:   Lesion of bladder (N32.9) ,  Constipation, Bowel/Bladder dysfunction      Referring Provider: Erin  Date of Evaluation:    2/9/2024    Precautions:  None Next MD visit:   none scheduled  Date of Surgery: n/a     PATIENT SUMMARY   Moilna Davila is a 19 year old male  who presents to therapy today with complaints of urinary complaints and bowel complaints. Reports his bowel function does not seem to go normal. Patient reports that the symptoms started in June, 2023, he started having constipation, to a point where he can have pain in his low back to the buttocks to the legs, he could hardly move, he could not do anything during the summer, he would eat a lot and have very small stool, he was also bloated, then he sought consult and was recommended Miralax and benefiber, and the symptoms got better, around that same time he started having urinary problems, where he would have slow stream, and it would stop, he was straining a lot to urinate, he would have pain  from trying to pee, sometimes he would force himself to have a BM just to urinate. Patient also had TURBT a month ago. Prior to his procedure, since starting the Miralax and benefiber, the bowel movements and urination have improved, but now he started having some symptoms again, but he also stopped the Miralax and jsut taking benefiber as needed. Initially after the procedure he was seeing blood in his urine, so he has beome apprehensive with having BM and urinating, but he feels that it is now healing and has not had blood pass out recently. He also has a weird feeling on the lower abdominal region, he does not know how to explain it, its random, its on the base of the penis.  Current symptoms include: pressure and post void dribble    Pt describes pain level: current 4-5/10, best  3 /10, worst 6/10.   Quality: other: discomfort    Occupation/Activities: student  CRADI - 8 - 21.88  JONO-6  - 29.17    Molina describes prior level of function no problems. Pt goals include to go back to feel good after having BM.  Past medical history was reviewed with Molina. Significant findings include  has a past medical history of Anesthesia complication and Asthma.    He has no past medical history of Difficult intubation, Family history of malignant hyperthermia, Family history of pseudocholinesterase deficiency, History of adverse reaction to anesthesia, motion sickness, Malignant hyperthermia, PONV (postoperative nausea and vomiting), or Pseudocholinesterase deficiency.     URINARY HABITS  Types of symptoms: other:  peeing stops, then stinging   Events associated with the onset of urinary complaints: post void dribble  AbdominalPressure complaints: yes, discomfort  Urinary leakage with coughing, sneezing, or laughing none   Urinary leakage with urgencies none   Amount of urinary leakage (mild, mod, sev) no   Protection: Pads, tampons, liners used N/A   # of protection used N/A   Amount of saturation N/A   Incomplete bladder emptying no   Straining when voiding no   Post void dribble  yes   Toilet position: Sitting after surgery, now standing   Others:           BOWEL HABITS  Types of symptoms: other scared to push out    Frequency of bowel movements: 2  Stool consistency: [place] Stool Scale: 4  Do you strain with defecation: No   Laxative use: Yes, benefiber     SEXUAL HEALTH STATUS  Sexual [place] Status: Active  No issues  History of Sexual Abuse: N     ASSESSMENT  Molina presents to physical therapy evaluation with primary c/o urinary and bowel complaints. The results of the objective tests and measures show muscle tightness, decreased muscles flexibility, difficulty with relaxing PF muscles, PF dysfunction.  Functional deficits include but are not limited impaired voiding/ defecation mechanics.  Signs and symptoms are consistent with diagnosis of Lesion of bladder (N32.9) , Diarrhea, Bowel/Bladder  dysfunction . Pt and PT discussed evaluation findings, pathology, POC and HEP.  Pt voiced understanding and performs HEP correctly without reported pain. Skilled Pelvic Physical Therapy is medically necessary to address the above impairments and reach functional goals.    OBJECTIVE:   Posture: unremarkable  Pelvic Alignment: symmetric  Gait: pt ambulates on level ground with normal mechanics.     External Palpation: Moderate soft tissue restrictions on Suprapubic region  Scars (location/surgery): none  Abdominal Wall Integrity: good  Diastasis Recti: none    Range Of Motion  Lumbar AROM screen: WNL  LE AROM screen: grossly WNL     Strength (MMT) 5/5 QUANG LE  Transverse Abdominis: 5/5    Flexibility Summary: WNL QUANG LE except B hamstrings    Special Tests  ASLR - poor lumbar loading transfers    Informed consent for internal pelvic evaluation given: Yes, but patient opted not to do internal pelvic floor assessment at this time    External Observation:   Voluntary contraction: present   Voluntary relaxation: present      Today's Treatment and Response:   Patient education was provided on objective findings of external and internal evaluation and expectations with treatment outcomes. Educated on pelvic anatomy and function with diagrams and pelvic model, adequate hydration levels, proper toileting posture, and diaphragmatic breathing for PNS activation and pelvic floor relaxation     Patient was instructed in and issued a HEP for: Access Code: [email or web address]  URL: [email or web address]  Date: 02/09/2024  Prepared by: Digna Pickering    Exercises  - Supine Pelvic Floor Stretch  - 2 x daily - 7 x weekly - 1-2 sets - 3 reps - 30 hold  - Supine Diaphragmatic Breathing  - 2 x daily - 7 x weekly - 1-2 sets - 10 reps  - Supine Pelvic Floor Contraction  - 2 x daily - 7 x weekly - 1-2 sets - 5 reps - 5, rest 5-10 secs hold    Charges: PT [place] High Complexity, 1     Total Timed Treatment: 45 min     Total Treatment Time: 45 min      Based on clinical rationale and outcome measures, this evaluation involved High Complexity decision making due to 1-2 personal factors/comorbidities, 3 body structures involved/activity limitations, and evolving symptoms including pelvic pain  PLAN OF CARE:    Goals: (to be met in 4 visits)  Patient will have increased awareness for PF muscles contractions and relaxation to improve ability to promote relaxation and decrease lower abdominal, base of penis pain by 90%.  Patient will have improvement of PF muscles strength using the Laycock Scale to 5/10/10//10, to improve efficiency of PF contractions, improve bowel and bladder mechanics, to be able to void and defecate without problems and no straining - once patient is perceptive to internal assessment  Patient will demonstrate improved coordination of core and PF muscles, including proper respiration to facilitate bowel, bladder, sexual functions and minimize symptoms.  Patient will verbalize understanding of PF functions, knowledge of normal bowel, bladder, sexual mechanics, and utilize an established HEP to manage symptoms   Patient to report improved understanding of importance of proper diet and hydration for improved bladder output, and stool formation, consistent Type 3-4 bristol type stools and good stream, complete emptying without onset of pain.    Frequency / Duration: Patient will be seen for 1 x/week or a total of 4 visits over a 90 day period.  Treatment will include: Manual Therapy, Neuromuscular Re-education, Therapeutic Exercise, and Home Exercise Program instruction     Education or treatment limitation: None  Rehab Potential:good      Patient/Family/Caregiver was advised of these findings, precautions, and treatment options and has agreed to actively participate in planning and for this course of care.    Thank you for your referral. Please co-sign or sign and return this letter via fax as soon as possible to [phone]. If you have any  questions, please contact me at Dept: [phone]    Sincerely,  Electronically signed by therapist: Digna Pickering PT  Physician's certification required: Yes  I certify the need for these services furnished under this plan of treatment and while under my care.    X___________________________________________________ Date____________________    Certification From: 2/8/2024  To:5/8/2024

## 2024-02-09 ENCOUNTER — OFFICE VISIT (OUTPATIENT)
Dept: PHYSICAL THERAPY | Age: 19
End: 2024-02-09
Attending: UROLOGY
Payer: MEDICAID

## 2024-02-09 DIAGNOSIS — N32.9 LESION OF BLADDER: Primary | ICD-10-CM

## 2024-02-09 PROCEDURE — 97163 PT EVAL HIGH COMPLEX 45 MIN: CPT | Performed by: PHYSICAL THERAPIST

## 2024-02-15 ENCOUNTER — OFFICE VISIT (OUTPATIENT)
Dept: PHYSICAL THERAPY | Age: 19
End: 2024-02-15
Attending: UROLOGY
Payer: MEDICAID

## 2024-02-15 PROCEDURE — 97110 THERAPEUTIC EXERCISES: CPT | Performed by: PHYSICAL THERAPIST

## 2024-02-15 NOTE — PROGRESS NOTES
Diagnosis:   Lesion of bladder (N32.9) ,  Constipation, Bowel/Bladder dysfunction         Referring Provider: Erin  Date of Evaluation:    2/9/2024    Precautions:  None Next MD visit:   none scheduled  Date of Surgery: n/a   Insurance Primary/Secondary: [employer] MEDICAID / N/A     # Auth Visits: [id number]            Subjective: Patient reports that 2 days ago, he woke up with pain on the base of the penis that went down to his testicle, every time he moves he will feel the pain, but went away when he played basketball, he felt the pain again yesterday morning when he woke up, but not this morning. Pain was mild. Pain during end of urination sometimes, depends on how hydrated he is, when urine is more yellow, then he has more pain. Had BM this morning, has been more aware and adding fiber to diet, no Miralax. Stool was good, not hard or too soft.    Pain: mild      Objective: Difficulty with coordinating PF and core muscles. Required verbal cues for proper PF activation.      Assessment: Patient tolerated treatment well. Educated on bladder irritants, hydration, fiber, bulk forming foods. Patient does not have the knowledge to manage/ decrease symptoms on their own and will benefit from skills and knowledge of PT to manage/ decrease symptoms and achieve established goals. PT provided verbal cues (squeeze your anus, stop yourself from peeing), tactile cues for proper performance of exercises, and assessed response to interventions. No pain with exercises done today.    Goals:   Goals: (to be met in 4 visits)  Patient will have increased awareness for PF muscles contractions and relaxation to improve ability to promote relaxation and decrease lower abdominal, base of penis pain by 90%.  Patient will have improvement of PF muscles strength using the Laycock Scale to 5/10/10//10, to improve efficiency of PF contractions, improve bowel and bladder mechanics, to be able to void and defecate without problems and no straining -  once patient is perceptive to internal assessment  Patient will demonstrate improved coordination of core and PF muscles, including proper respiration to facilitate bowel, bladder, sexual functions and minimize symptoms.  Patient will verbalize understanding of PF functions, knowledge of normal bowel, bladder, sexual mechanics, and utilize an established HEP to manage symptoms   Patient to report improved understanding of importance of proper diet and hydration for improved bladder output, and stool formation, consistent Type 3-4 bristol type stools and good stream, complete emptying without onset of pain.    Plan: Continue PT as per established POC.  Date: 2/15/2024  TX#: 2/5 Date:                 TX#: 3/ Date:                 TX#: 4/ Date:                 TX#: 5/ Date:   Tx#: 6/   Elliptical x 5 mins       Calf stretches on incline board, Hamstrings stretches with strap, Piriformis stretches, Happy baby, adductor stretches, Modified vidya stretch 3x 30 secs  Diagphragmatic breathing, using 1 hand on the abdomen, another hand on the chest to promote for proper movement and increased awareness x 3 mins  TA brace x 10 with 5 secs hold  PF contractions, hold for 3 secs, relax for 10 secs to promote relaxation and increase awareness   SB lateral and A/P pelvic tilts x 20 each  Shuttle Leo squats x 20 with 25#, with PF contraction and relaxation              HEP: Access Code: [email or web address]     Charges: Thera ex-45 mins       Total Timed Treatment: 45 min  Total Treatment Time: 45 min

## 2024-02-22 ENCOUNTER — OFFICE VISIT (OUTPATIENT)
Dept: PHYSICAL THERAPY | Age: 19
End: 2024-02-22
Attending: UROLOGY
Payer: MEDICAID

## 2024-02-22 PROCEDURE — 97110 THERAPEUTIC EXERCISES: CPT | Performed by: PHYSICAL THERAPIST

## 2024-02-22 NOTE — PROGRESS NOTES
Diagnosis:   Lesion of bladder (N32.9) ,  Constipation, Bowel/Bladder dysfunction         Referring Provider: Erin  Date of Evaluation:    2/9/2024    Precautions:  None Next MD visit:   none scheduled  Date of Surgery: n/a   Insurance Primary/Secondary: [employer] MEDICAID / N/A     # Auth Visits: [id number]            Subjective: Patient reports that he still feels the pain in the lower abdominal area when he urinates, stands to urinate. The past 2 days, his stool was a little harder. No sharp pains in the base of the penis. Has been slowly returning to his recreational activities.   Pain: 2-6/10, depends how hydrated he is.      Objective: Difficulty with coordinating PF and core muscles. Still required verbal and tactile cues for proper PF activation.      Assessment: Patient is progressing well with PT, demonstrated proper performance of diaphragmatic breathing. Discussed importance of increasing awareness for PF relaxation and regular performance of HEP.     Goals:   Goals: (to be met in 4 visits)  Patient will have increased awareness for PF muscles contractions and relaxation to improve ability to promote relaxation and decrease lower abdominal, base of penis pain by 90%.  Patient will have improvement of PF muscles strength using the Laycock Scale to 5/10/10//10, to improve efficiency of PF contractions, improve bowel and bladder mechanics, to be able to void and defecate without problems and no straining - once patient is perceptive to internal assessment  Patient will demonstrate improved coordination of core and PF muscles, including proper respiration to facilitate bowel, bladder, sexual functions and minimize symptoms.  Patient will verbalize understanding of PF functions, knowledge of normal bowel, bladder, sexual mechanics, and utilize an established HEP to manage symptoms   Patient to report improved understanding of importance of proper diet and hydration for improved bladder output, and stool formation,  consistent Type 3-4 bristol type stools and good stream, complete emptying without onset of pain.    Plan: Continue PT as per established POC.  Date: 2/15/2024  TX#: 2/5 Date:    2/22/2024   TX#: 3/5 Date:                 TX#: 4/ Date:                 TX#: 5/ Date:   Tx#: 6/   Elliptical x 5 mins Elliptical x 5 mins      Calf stretches on incline board, Hamstrings stretches with strap, Piriformis stretches, Happy baby, adductor stretches, Modified vidya stretch 3x 30 secs  Diagphragmatic breathing, using 1 hand on the abdomen, another hand on the chest to promote for proper movement and increased awareness x 3 mins  TA brace x 10 with 5 secs hold  PF contractions, hold for 3 secs, relax for 10 secs to promote relaxation and increase awareness   SB lateral and A/P pelvic tilts x 20 each  Shuttle Leo squats x 20 with 25#, with PF contraction and relaxation Calf stretches on incline board, Hamstrings stretches with strap, Piriformis stretches, Happy baby, adductor stretches, Modified vidya stretch 3x 30 secs  Diagphragmatic breathing, using 1 hand on the abdomen, another hand on the chest to promote for proper movement and increased awareness x 3 mins  TA brace x 10 with 5 secs hold  PF contractions, hold for 3 secs, relax for 10 secs to promote relaxation and increase awareness   SB lateral and A/P pelvic tilts x 20 each  Shuttle Leo squats N and ER x 20 with 50#, with PF contraction and relaxation             HEP: Access Code: [email or web address]     Charges: Thera ex-45 mins       Total Timed Treatment: 45 min  Total Treatment Time: 45 min

## 2024-02-29 ENCOUNTER — OFFICE VISIT (OUTPATIENT)
Dept: PHYSICAL THERAPY | Age: 19
End: 2024-02-29
Attending: UROLOGY
Payer: MEDICAID

## 2024-02-29 PROCEDURE — 97110 THERAPEUTIC EXERCISES: CPT | Performed by: PHYSICAL THERAPIST

## 2024-02-29 NOTE — PROGRESS NOTES
Diagnosis:   Lesion of bladder (N32.9) ,  Constipation, Bowel/Bladder dysfunction         Referring Provider: Erin  Date of Evaluation:    2/9/2024    Precautions:  None Next MD visit:   none scheduled  Date of Surgery: n/a   Insurance Primary/Secondary: [employer]ICA[employer] / N/A     # Auth Visits: 9            Subjective: Patient reports that he was constipated in the past 2 days, so his stool is a little bit hard, but he did not strain to defecate. He still notices pain in the bladder after he urinates. States that he tried to run, and he noticed a strain on his lower abdominal region  Pain: 2-6/10, depends how hydrated he is.      Objective: Still required verbal and tactile cues for proper PF activation and relaxation.      Assessment: Patient was educated again regarding hydration and anatomy of PF muscles. Discussed about getting dehydrated affects the stool as the colon reabsorbs the water. Also discussed how the PF muscles have to relax when urinating. Patient needs to increase compliance with HEP.    Goals:   Goals: (to be met in 4 visits)  Patient will have increased awareness for PF muscles contractions and relaxation to improve ability to promote relaxation and decrease lower abdominal, base of penis pain by 90%.  Patient will have improvement of PF muscles strength using the Laycock Scale to 5/10/10//10, to improve efficiency of PF contractions, improve bowel and bladder mechanics, to be able to void and defecate without problems and no straining - once patient is perceptive to internal assessment  Patient will demonstrate improved coordination of core and PF muscles, including proper respiration to facilitate bowel, bladder, sexual functions and minimize symptoms.  Patient will verbalize understanding of PF functions, knowledge of normal bowel, bladder, sexual mechanics, and utilize an established HEP to manage symptoms   Patient to report improved understanding of importance of proper diet and  hydration for improved bladder output, and stool formation, consistent Type 3-4 bristol type stools and good stream, complete emptying without onset of pain.    Plan: Continue PT as per established POC.  Date: 2/15/2024  TX#: 2/5 Date:    2/22/2024   TX#: 3/5 Date:    2/29/2024           TX#: 4/5 Date:                 TX#: 5/ Date:   Tx#: 6/   Elliptical x 5 mins Elliptical x 5 mins Elliptical x 5 mins     Calf stretches on incline board, Hamstrings stretches with strap, Piriformis stretches, Happy baby, adductor stretches, Modified vidya stretch 3x 30 secs  Diagphragmatic breathing, using 1 hand on the abdomen, another hand on the chest to promote for proper movement and increased awareness x 3 mins  TA brace x 10 with 5 secs hold  PF contractions, hold for 3 secs, relax for 10 secs to promote relaxation and increase awareness   SB lateral and A/P pelvic tilts x 20 each  Shuttle Leo squats x 20 with 25#, with PF contraction and relaxation Calf stretches on incline board, Hamstrings stretches with strap, Piriformis stretches, Happy baby, adductor stretches, Modified vidya stretch 3x 30 secs  Diagphragmatic breathing, using 1 hand on the abdomen, another hand on the chest to promote for proper movement and increased awareness x 3 mins  TA brace x 10 with 5 secs hold  PF contractions, hold for 3 secs, relax for 10 secs to promote relaxation and increase awareness   SB lateral and A/P pelvic tilts x 20 each  Shuttle Leo squats N and ER x 20 with 50#, with PF contraction and relaxation Calf stretches on incline board, Hamstrings stretches with strap, Piriformis stretches, Happy baby, adductor stretches, Modified vidya stretch 2x 30 secs  Diagphragmatic breathing, using 1 hand on the abdomen, another hand on the chest to promote for proper movement and increased awareness x 3 mins  TA brace x 10 with 5 secs hold  PF contractions, hold for 3 secs, relax for 10 secs to promote relaxation and increase awareness   Shuttle  Leo squats N  x 20 with 50#, with PF contraction and relaxation            HEP: Access Code: [email or web address]     Charges: Thera ex-40 mins       Total Timed Treatment: 40 min  Total Treatment Time: 40 min

## 2024-03-05 ENCOUNTER — APPOINTMENT (OUTPATIENT)
Dept: URBAN - METROPOLITAN AREA CLINIC 249 | Age: 19
Setting detail: DERMATOLOGY
End: 2024-03-05

## 2024-03-05 DIAGNOSIS — L81.0 POSTINFLAMMATORY HYPERPIGMENTATION: ICD-10-CM

## 2024-03-05 DIAGNOSIS — L28.0 LICHEN SIMPLEX CHRONICUS: ICD-10-CM

## 2024-03-05 DIAGNOSIS — R21 RASH AND OTHER NONSPECIFIC SKIN ERUPTION: ICD-10-CM

## 2024-03-05 PROCEDURE — OTHER PRESCRIPTION MEDICATION MANAGEMENT: OTHER

## 2024-03-05 PROCEDURE — OTHER MIPS QUALITY: OTHER

## 2024-03-05 PROCEDURE — OTHER COUNSELING: OTHER

## 2024-03-05 PROCEDURE — 99213 OFFICE O/P EST LOW 20 MIN: CPT

## 2024-03-05 PROCEDURE — OTHER PRESCRIPTION: OTHER

## 2024-03-05 RX ORDER — TRIAMCINOLONE ACETONIDE 1 MG/G
OINTMENT TOPICAL
Qty: 80 | Refills: 1 | Status: ERX | COMMUNITY
Start: 2024-03-05

## 2024-03-05 ASSESSMENT — LOCATION DETAILED DESCRIPTION DERM
LOCATION DETAILED: RIGHT INFERIOR POSTAURICULAR SKIN
LOCATION DETAILED: RIGHT CENTRAL POSTAURICULAR SKIN
LOCATION DETAILED: SUPRAPUBIC SKIN

## 2024-03-05 ASSESSMENT — LOCATION SIMPLE DESCRIPTION DERM
LOCATION SIMPLE: GROIN
LOCATION SIMPLE: POSTERIOR SCALP
LOCATION SIMPLE: SCALP

## 2024-03-05 ASSESSMENT — LOCATION ZONE DERM
LOCATION ZONE: TRUNK
LOCATION ZONE: SCALP

## 2024-03-05 ASSESSMENT — SEVERITY ASSESSMENT: SEVERITY: MILD

## 2024-03-05 NOTE — PROCEDURE: PRESCRIPTION MEDICATION MANAGEMENT
Detail Level: Zone
Render In Strict Bullet Format?: No
Discontinue Regimen: Triamcinolone cream on groin area since it resolved. Use PRN.

## 2024-03-11 ENCOUNTER — TELEPHONE (OUTPATIENT)
Dept: PHYSICAL THERAPY | Facility: HOSPITAL | Age: 19
End: 2024-03-11

## 2024-03-12 ENCOUNTER — OFFICE VISIT (OUTPATIENT)
Dept: PHYSICAL THERAPY | Age: 19
End: 2024-03-12
Attending: UROLOGY
Payer: MEDICAID

## 2024-03-12 PROCEDURE — 97110 THERAPEUTIC EXERCISES: CPT | Performed by: PHYSICAL THERAPIST

## 2024-03-12 NOTE — PROGRESS NOTES
Diagnosis:   Lesion of bladder (N32.9) ,  Constipation, Bowel/Bladder dysfunction         Referring Provider: Erin  Date of Evaluation:    2/9/2024    Precautions:  None Next MD visit:   none scheduled  Date of Surgery: n/a   Insurance Primary/Secondary: [employer]ICA[employer] / N/A     # Auth Visits: 9       Progress Summary  Pt has attended 5 visits in Physical Therapy.          Subjective: Patient reports that the overall, he feels better, he does not feel that the bowel issues that he had before is a problem anymore, he knows the importance of eating properly, having fiber, fruit on his diet, not straining when he has a BM. He reports that he still has problems when he urinates, its more toward the end, he does not feel that his PF muscles relaxes completely, there is dribbles of urine at the one, and he feels some discomfort. He also has to think about relaxing his body before he takes a step.     Pain: 1-5 /10, depends how hydrated he is.      Objective:  External Palpation: Mild soft tissue restrictions on Suprapubic region  Scars (location/surgery): none  Abdominal Wall Integrity: good  Diastasis Recti: none     Range Of Motion  Lumbar AROM screen: WNL  LE AROM screen: grossly WNL      Strength (MMT) 5/5 QUANG LE  Transverse Abdominis: 5/5     Flexibility Summary: WNL QUANG LE      Special Tests  ASLR - fair lumbar loading transfers     Informed consent for internal pelvic evaluation given: Asked patient about internal assessment , but patient opted not to do internal pelvic floor assessment.     External Observation:   Voluntary contraction: present   Voluntary relaxation: present   Palpated between coccyx and ischial tuberosity, felt contraction and relaxation         Assessment: Patient is progressing well with PT treatments with increased awareness for PF relaxation, and increased knowledge regarding Bowel mechanics and importance of diet and hydration. Patient acknowledges that he he has to be more compliant  with his stretching exercises and exercises to promote pelvic floor relaxation. Patient and guardian present during discussion of updated plan of care and patient's responsibilities for compliance of HEP. Patient has resumed recreational activities including basketball. Patient will benefit from follow up to determine follow through with exercises and re-assessment of condition.      Goals:   Goals: (to be met in 6 visits)  Patient will have increased awareness for PF muscles contractions and relaxation to improve ability to promote relaxation and decrease lower abdominal, base of penis pain by 90%.- progressing  Patient will have improvement of PF muscles strength using the Laycock Scale to 5/10/10//10, to improve efficiency of PF contractions, improve bowel and bladder mechanics, to be able to void and defecate without problems and no straining - once patient is perceptive to internal assessment- progressing  Patient will demonstrate improved coordination of core and PF muscles, including proper respiration to facilitate bowel, bladder, sexual functions and minimize symptoms.-MET  Patient will verbalize understanding of PF functions, knowledge of normal bowel, bladder, sexual mechanics, and utilize an established HEP to manage symptoms - MET  Patient to report improved understanding of importance of proper diet and hydration for improved bladder output, and stool formation, consistent Type 3-4 bristol type stools and good stream, complete emptying without onset of pain.- progressing    Plan: Continue PT as per updated POC.  Date: 2/15/2024  TX#: 2/5 Date:    2/22/2024   TX#: 3/5 Date:    2/29/2024           TX#: 4/5 Date:    3/12/2024           TX#: 5/5 Date:   Tx#: 6/   Elliptical x 5 mins Elliptical x 5 mins Elliptical x 5 mins Elliptical x 5 mins    Calf stretches on incline board, Hamstrings stretches with strap, Piriformis stretches, Happy baby, adductor stretches, Modified vidya stretch 3x 30  secs  Diagphragmatic breathing, using 1 hand on the abdomen, another hand on the chest to promote for proper movement and increased awareness x 3 mins  TA brace x 10 with 5 secs hold  PF contractions, hold for 3 secs, relax for 10 secs to promote relaxation and increase awareness   SB lateral and A/P pelvic tilts x 20 each  Shuttle Leo squats x 20 with 25#, with PF contraction and relaxation Calf stretches on incline board, Hamstrings stretches with strap, Piriformis stretches, Happy baby, adductor stretches, Modified vidya stretch 3x 30 secs  Diagphragmatic breathing, using 1 hand on the abdomen, another hand on the chest to promote for proper movement and increased awareness x 3 mins  TA brace x 10 with 5 secs hold  PF contractions, hold for 3 secs, relax for 10 secs to promote relaxation and increase awareness   SB lateral and A/P pelvic tilts x 20 each  Shuttle Leo squats N and ER x 20 with 50#, with PF contraction and relaxation Calf stretches on incline board, Hamstrings stretches with strap, Piriformis stretches, Happy baby, adductor stretches, Modified vidya stretch 2x 30 secs  Diagphragmatic breathing, using 1 hand on the abdomen, another hand on the chest to promote for proper movement and increased awareness x 3 mins  TA brace x 10 with 5 secs hold  PF contractions, hold for 3 secs, relax for 10 secs to promote relaxation and increase awareness   Shuttle Leo squats N  x 20 with 50#, with PF contraction and relaxation Calf stretches on incline board, Hamstrings stretches with strap, Piriformis stretches, Happy baby, adductor stretches, Modified vidya stretch 2x 30 secs  Diagphragmatic breathing, using 1 hand on the abdomen, another hand on the chest to promote for proper movement and increased awareness x 3 mins  TA brace x 10 with 5 secs hold  PF contractions, hold for 3 secs, relax for 10 secs to promote relaxation and increase awareness   Shuttle Leo squats N  x 20 with 50#, with PF contraction and  relaxation           HEP: Access Code: [email or web address]       Plan: Continue skilled Physical Therapy 1 x/week or a total of 1 visits over a 90 day period. Treatment will include: NMR, Therapeutic exercises, HEP education, Manual therapy       Patient/Family/Caregiver was advised of these findings, precautions, and treatment options and has agreed to actively participate in planning and for this course of care.    Thank you for your referral. If you have any questions, please contact me at Dept: [phone].    Sincerely,  Electronically signed by therapist: Digna Pickering PT     Physician's certification required:  No  Please co-sign or sign and return this letter via fax as soon as possible to [phone].   I certify the need for these services furnished under this plan of treatment and while under my care.    X___________________________________________________ Date____________________    Certification From: 3/12/2024  To:6/10/2024    Charges: Thera ex-40 mins       Total Timed Treatment: 40 min  Total Treatment Time: 40 min

## 2024-03-14 ENCOUNTER — APPOINTMENT (OUTPATIENT)
Dept: PHYSICAL THERAPY | Age: 19
End: 2024-03-14
Attending: UROLOGY
Payer: MEDICAID

## 2024-03-21 ENCOUNTER — APPOINTMENT (OUTPATIENT)
Dept: PHYSICAL THERAPY | Age: 19
End: 2024-03-21
Attending: UROLOGY
Payer: MEDICAID

## 2024-03-28 ENCOUNTER — APPOINTMENT (OUTPATIENT)
Dept: PHYSICAL THERAPY | Age: 19
End: 2024-03-28
Attending: UROLOGY
Payer: MEDICAID

## 2024-04-04 ENCOUNTER — APPOINTMENT (OUTPATIENT)
Dept: PHYSICAL THERAPY | Age: 19
End: 2024-04-04
Attending: UROLOGY
Payer: MEDICAID

## 2024-04-10 ENCOUNTER — TELEPHONE (OUTPATIENT)
Dept: PHYSICAL THERAPY | Age: 19
End: 2024-04-10

## 2024-04-11 ENCOUNTER — APPOINTMENT (OUTPATIENT)
Dept: PHYSICAL THERAPY | Age: 19
End: 2024-04-11
Attending: UROLOGY
Payer: MEDICAID

## 2024-04-16 ENCOUNTER — OFFICE VISIT (OUTPATIENT)
Dept: PHYSICAL THERAPY | Age: 19
End: 2024-04-16
Attending: UROLOGY
Payer: MEDICAID

## 2024-04-16 PROCEDURE — 97110 THERAPEUTIC EXERCISES: CPT | Performed by: PHYSICAL THERAPIST

## 2024-04-16 NOTE — PROGRESS NOTES
Diagnosis:   Lesion of bladder (N32.9) ,  Constipation, Bowel/Bladder dysfunction         Referring Provider: Erin  Date of Evaluation:    2/9/2024    Precautions:  None Next MD visit:   none scheduled  Date of Surgery: n/a   Insurance Primary/Secondary: [employer]ICA[employer] / N/A     # Auth Visits: 9      Discharge Summary  Pt has attended 6 visits in Physical Therapy.            Subjective: Patient reports that when he urinates, most of the time he still does not relax, and will feel pain when he during the end, midway he feels that he is pushing, and his body feels that he is pushing even if he knows that he can relax. The diaphragmatic breathing helps with the relaxation. BM has not been a problem, he is more aware and knowledgeable on his diet. He also reports that he should be better with doing his home exercises.  Pain: 3-4-5/10      Objective:  External Palpation: Mild soft tissue restrictions on Suprapubic region  Scars (location/surgery): none  Abdominal Wall Integrity: good  Diastasis Recti: none     Range Of Motion  Lumbar AROM screen: WNL  LE AROM screen: grossly WNL      Strength (MMT) 5/5 QUANG LE  Transverse Abdominis: 5/5     Flexibility Summary: WNL QUANG LE      Special Tests  ASLR - fair lumbar loading transfers     Informed consent for internal pelvic evaluation given: Asked patient about internal assessment , but patient opted not to do internal pelvic floor assessment.     External Observation:   Voluntary contraction: present   Voluntary relaxation: present   Palpated between coccyx and ischial tuberosity, felt contraction and relaxation         Assessment: Patient is back after doing HEP for 4 weeks for follow up. He still presents with symptoms of mild pain and difficulty with PF relaxation during and toward end of urination. He has not been regular with performance of his HEP, and acknowledges it. No significant difference was seen compared to last assessment. Patient opted not to do internal  assessment of the PF muscles and at this point PT is no longer beneficial. Patient will be discharged from PT, discussed with patient and grandmother and have understood the plan.     Goals:   Goals: (to be met in 6 visits)  Patient will have increased awareness for PF muscles contractions and relaxation to improve ability to promote relaxation and decrease lower abdominal, base of penis pain by 90%.- progressing  Patient will have improvement of PF muscles strength using the Laycock Scale to 5/10/10//10, to improve efficiency of PF contractions, improve bowel and bladder mechanics, to be able to void and defecate without problems and no straining - once patient is perceptive to internal assessment- progressing  Patient will demonstrate improved coordination of core and PF muscles, including proper respiration to facilitate bowel, bladder, sexual functions and minimize symptoms.-MET  Patient will verbalize understanding of PF functions, knowledge of normal bowel, bladder, sexual mechanics, and utilize an established HEP to manage symptoms - MET  Patient to report improved understanding of importance of proper diet and hydration for improved bladder output, and stool formation, consistent Type 3-4 bristol type stools and good stream, complete emptying without onset of pain.- progressing    Plan: Patient is discharged on HEP.  Date: 2/15/2024  TX#: 2/5 Date:    2/22/2024   TX#: 3/5 Date:    2/29/2024           TX#: 4/5 Date:    3/12/2024           TX#: 5/5 Date: 4/16/2024   Tx#: 6/6   Elliptical x 5 mins Elliptical x 5 mins Elliptical x 5 mins Elliptical x 5 mins Elliptical x 5 mins   Calf stretches on incline board, Hamstrings stretches with strap, Piriformis stretches, Happy baby, adductor stretches, Modified vidya stretch 3x 30 secs  Diagphragmatic breathing, using 1 hand on the abdomen, another hand on the chest to promote for proper movement and increased awareness x 3 mins  TA brace x 10 with 5 secs hold  PF  contractions, hold for 3 secs, relax for 10 secs to promote relaxation and increase awareness   SB lateral and A/P pelvic tilts x 20 each  Shuttle Leo squats x 20 with 25#, with PF contraction and relaxation Calf stretches on incline board, Hamstrings stretches with strap, Piriformis stretches, Happy baby, adductor stretches, Modified vidya stretch 3x 30 secs  Diagphragmatic breathing, using 1 hand on the abdomen, another hand on the chest to promote for proper movement and increased awareness x 3 mins  TA brace x 10 with 5 secs hold  PF contractions, hold for 3 secs, relax for 10 secs to promote relaxation and increase awareness   SB lateral and A/P pelvic tilts x 20 each  Shuttle Leo squats N and ER x 20 with 50#, with PF contraction and relaxation Calf stretches on incline board, Hamstrings stretches with strap, Piriformis stretches, Happy baby, adductor stretches, Modified vidya stretch 2x 30 secs  Diagphragmatic breathing, using 1 hand on the abdomen, another hand on the chest to promote for proper movement and increased awareness x 3 mins  TA brace x 10 with 5 secs hold  PF contractions, hold for 3 secs, relax for 10 secs to promote relaxation and increase awareness   Shuttle Leo squats N  x 20 with 50#, with PF contraction and relaxation Calf stretches on incline board, Hamstrings stretches with strap, Piriformis stretches, Happy baby, adductor stretches, Modified vidya stretch 2x 30 secs  Diagphragmatic breathing, using 1 hand on the abdomen, another hand on the chest to promote for proper movement and increased awareness x 3 mins  TA brace x 10 with 5 secs hold  PF contractions, hold for 3 secs, relax for 10 secs to promote relaxation and increase awareness   Shuttle Leo squats N  x 20 with 50#, with PF contraction and relaxation Calf stretches on incline board, Hamstrings stretches with strap, Piriformis stretches, Happy baby, adductor stretches, Modified vidya stretch 2x 30 secs  Diagphragmatic  breathing, using 1 hand on the abdomen, another hand on the chest to promote for proper movement and increased awareness x 3 mins  TA brace x 10 with 5 secs hold  PF contractions, hold for 3 secs, relax for 10 secs to promote relaxation and increase awareness   Shuttle Leo squats N  x 20 with 50#, with PF contraction and relaxation       Thera -ex - 40 mins   HEP: Access Code: [email or web address]       Plan: Patient is now discharged from PT.       Patient/Family/Caregiver was advised of these findings, precautions, and treatment options and has agreed to actively participate in planning and for this course of care.    Thank you for your referral. If you have any questions, please contact me at Dept: [phone].    Sincerely,  Electronically signed by therapist: Digna Pickering PT     Physician's certification required:  No  Please co-sign or sign and return this letter via fax as soon as possible to [phone].   I certify the need for these services furnished under this plan of treatment and while under my care.    X___________________________________________________ Date____________________    Certification From: 3/12/2024  To:6/10/2024    Charges: Thera ex- 3 mins       Total Timed Treatment: 40 min  Total Treatment Time: 40 min

## 2024-04-25 ENCOUNTER — TELEPHONE (OUTPATIENT)
Dept: SURGERY | Facility: CLINIC | Age: 19
End: 2024-04-25

## 2024-04-25 ENCOUNTER — OFFICE VISIT (OUTPATIENT)
Dept: SURGERY | Facility: CLINIC | Age: 19
End: 2024-04-25

## 2024-04-25 DIAGNOSIS — R82.90 URINE FINDING: Primary | ICD-10-CM

## 2024-04-25 LAB
APPEARANCE: CLEAR
BILIRUBIN: NEGATIVE
GLUCOSE (URINE DIPSTICK): NEGATIVE MG/DL
KETONES (URINE DIPSTICK): 15 MG/DL
LEUKOCYTES: NEGATIVE
MULTISTIX LOT#: ABNORMAL NUMERIC
NITRITE, URINE: NEGATIVE
OCCULT BLOOD: NEGATIVE
PH, URINE: 5.5 (ref 4.5–8)
PROTEIN (URINE DIPSTICK): 30 MG/DL
SPECIFIC GRAVITY: >=1.03 (ref 1–1.03)
UROBILINOGEN,SEMI-QN: 0.2 MG/DL (ref 0–1.9)

## 2024-04-25 PROCEDURE — 51798 US URINE CAPACITY MEASURE: CPT | Performed by: UROLOGY

## 2024-04-25 PROCEDURE — 99214 OFFICE O/P EST MOD 30 MIN: CPT | Performed by: UROLOGY

## 2024-04-25 PROCEDURE — 81003 URINALYSIS AUTO W/O SCOPE: CPT | Performed by: UROLOGY

## 2024-04-25 NOTE — TELEPHONE ENCOUNTER
Per grandmother patient did not understand what was discussed at appointment today, unsure why he needs to see PCP. Please call thank you.

## 2024-04-25 NOTE — TELEPHONE ENCOUNTER
This encounter is now closed.     RN called grandma. Per grandma, patient did not understand well why he needs to follow up with PCP.     RN explained that patient referred to PCP d/t abn POC, urinalysis result.    Ketones, UA  Negative - Trace mg/dL 15 Abnormal    Spec Gravity  1.005 - 1.030 >=1.030   Blood Urine  Negative Negative   PH Urine  5.0 - 8.0 5.5   Protein Urine  Negative - Trace mg/dL 30 Abnormal      She agreed to plans and thankful.

## 2024-04-25 NOTE — TELEPHONE ENCOUNTER
Sent Dr. Rodriguez's 4/25 appointment notes and UA results to [facility] via fax per Dr. Rodriguez.

## 2024-04-25 NOTE — PROGRESS NOTES
Urology Clinic Note    Primary Care Provider:  Mary Webber     Chief Complaint:   Cystitis glandularis    HPI:     Molina Davila is a 19 year old otherwise healthy male who presents for evaluation of pain with voiding and perineal pain with LUTS. Now found to have abnormal bladder mucosa sp TURBT on 1/9/24; path with cystitis glandularis.       Initially seen 10/2023;   Patient presented recently for evaluation of several urologic issues over the past 4 months. He reports he initially had issues with urination; noticed weak stream, straining, frequency. AUA symptom score is 19/35. No previous trauma. Around the same time has been feeling discomfort in the perineum and testicles; vague dull pain; no aggravating factors. Was seen at an ED a10/2023 and an US was done but he is unsure of the results. He has tried NSAIDS which does help; did not try scrotal support. Also had STI testing per PCP; this was all negative per [facility] encounter on 10/12/23.   Finally, he has noticed a rash that was initially red/scaly at the left base of penis along suprapubic region and in the perineum. He was given Clotramizole per PCP; the perineal rash resolved and the suprapubic rash has improved and is now a darker pigmentation than surrounding skin but is not red or scaly- not  PVR 20cc  UA negative      At initial visit, we recommended dermatology for his skin rash. For LUTS-vikor testing was sent and negative.  We also recommended bowel regimen.   Scrotal US 11/6/23; small cysts on epididimal heads bilaterally. Mild left varicocele.   Given his LUTS and pain, presented for cystoscopy to rule out stricture or other pathology.      Cysto; 11/16/23;   Moderately enlarged for age without signs of obstruction, mildly high bladder neck, Significant bladder neck inflammation with bullous edema- uploaded to media- extrinsic compression from bowel constipation noted throughout bladder    Cytology- negative.      CTU 11/24/23;   Essentaially  normal; large amount of constipation.      He followed up shortly after. We discussed his issues with ongoing constipation- quite severe.   We treated with miralax and GI referral was placed.   His symptoms greatly improved.      Extensive discussion had regarding management of abnormal tissue and patients severe LUTS (although this has been improving with time). There is a chance that this could be cystitis glandularis for which treatment is generally resection.    Case was discussed with [facility] given abnormal presentation; plan was for resection and then antibiotics, GI, PFPT with repeat cystoscopy in the future.      He presented for TURBT on 1/9/24;   Narrow urethra. Prostate slightly obstructive, hypervascular. High bladder neck. Bladder with large amount of bullous edema and inflammatory changes along the bladder neck from 4 o clock to 8 o clock; also edema at the trigone and near both ureteral orifices. Several biopsies were taken at the bladder neck and trigone with cold cup forceps.  Biopsy areas fulgurated.  All areas of inflammation in edema near the bladder neck and trigone were fulgurated.  The edema closest to the ureteral orifices was not fulgurated to ensure no ureteral orifice injury.  Prostate was quite hypervascular as well, this was fulgurated.  Hemostasis was good.       Seen postop and was doing great after a void trial.   He reports almost complete resolution in his bladder and voiding symptoms.  Does still have a small suprapubic rash, however he now has an itchy and scaly rash behind his right ear.  Of note, previously steroids have helped these rashes.  He has a history of food allergies but no asthma.    At last visit plan was to observe and potentially use COX2 inhibitor or steriods if symptoms returned. Plan was to repeat a cystoscopy in 3mo.   He has now completed PFPT,  this went well.   He saw Dr. Nathaniel CHENEY; GINA with ongoing stool burden; a bowel purge was recommended but patient  did not answer phone call so this was never started.  This was back in January 2024.  He has seen dermatology.  He was started on a cream for his rashes.  This is improved greatly.  He has follow-up with them.  This is an outside institution.  He feels great today.  His urinary symptoms are fully resolved.  He has very rare scrotal content pain which is not very bothersome.  He is having daily bowel movements, he he does state that this is sometimes difficult and related to his dietary habits.  He has no gross hematuria.    Of note, patient was initially scheduled for cystoscopy today but he does not want to do this.      UA no blood or infection  PVR 1 ML      PSA:  No results found for: \"PSA\", \"PERCENTPSA\", \"PSAS\", \"PSAULTRA\", \"QPSA\", \"PSATOT\", \"TOTPSADX\", \"TOTPSASCREEN\"     History:     Past Medical History:    Anesthesia complication    Patient reports he was awake during wisom teeth revomal    Asthma (HCC)       Past Surgical History:   Procedure Laterality Date    [place] teeth removed         Family History   Problem Relation Age of Onset    Diabetes Mother     Diabetes Maternal Grandmother     Stroke Maternal Grandfather     Diabetes Other         Maternal Uncle,Aunt    Heart Disease Neg        Social History     Socioeconomic History    Marital status: Single   Tobacco Use    Smoking status: Never    Smokeless tobacco: Never   Vaping Use    Vaping status: Some Days    Substances: Nicotine    Devices: Disposable   Substance and Sexual Activity    Alcohol use: No     Alcohol/week: 0.0 standard drinks of alcohol    Drug use: Yes     Types: Cannabis     Social Determinants of Health     Financial Resource Strain: Medium Risk (3/27/2024)    Received from [facility]    Overall Financial Resource Strain (CARDIA)     Difficulty of Paying Living Expenses: Somewhat hard   Food Insecurity: No Food Insecurity (3/27/2024)    Received from [facility]    Hunger Vital Sign      Worried About Running Out of Food in the Last Year: Never true     Ran Out of Food in the Last Year: Never true   Transportation Needs: No Transportation Needs (3/27/2024)    Received from [facility]    PRAPARE - Transportation     Lack of Transportation (Medical): No     Lack of Transportation (Non-Medical): No   Housing Stability: Low Risk  (3/27/2024)    Received from [facility]    Housing Stability Vital Sign     Unable to Pay for Housing in the Last Year: No     Number of Places Lived in the Last Year: 1     In the last 12 months, was there a time when you did not have a steady place to sleep or slept in a shelter (including now)?: No       Medications (Active prior to today's visit):  Current Outpatient Medications   Medication Sig Dispense Refill    ketoconazole 2 % External Cream APPLY TOPICALLY TO RASH ON GROIN EVERY DAY IN THE EVENING FOR 3 TO 4 WEEKS. REPEAT AS NEEDED AT ONSET OF FLARES      phenazopyridine (PYRIDIUM) 100 MG Oral Tab Take 1 tablet (100 mg total) by mouth 3 (three) times daily as needed for Pain. This will turn your urine orange. (Patient not taking: Reported on 1/30/2024) 10 tablet 0    oxybutynin 5 MG Oral Tab Take 1 tablet (5 mg total) by mouth 3 (three) times daily as needed (Bladder spasms). Stop 12 hours before Col[place] catheter removal in clinic (if applicable) (Patient not taking: Reported on 1/30/2024) 15 tablet 0    polyethylene glycol, PEG 3350, 17 g Oral Powd Pack Take 17 g by mouth daily. (Patient not taking: Reported on 1/30/2024)      tamsulosin 0.4 MG Oral Cap Take 1 capsule (0.4 mg total) by mouth every evening. (Patient not taking: Reported on 1/30/2024) 90 capsule 6    Albuterol Sulfate  (90 Base) MCG/ACT Inhalation Aero Soln          Allergies:  Allergies   Allergen Reactions    Albumin, Egg OTHER (SEE COMMENTS)     Patient unsure if true allergy, states he eats eggs regularly       Review of Systems:   A comprehensive  10-point review of systems was completed.  Pertinent positives and negatives are noted in the the HPI.    Physical Exam:   CONSTITUTIONAL: Well developed, well nourished, in no acute distress  NEUROLOGIC: Alert and oriented  HEAD: Normocephalic, atraumatic  EYES: Sclera non-icteric  ENT: Hearing intact, moist mucous membranes  NECK: No obvious goiter or masses  RESPIRATORY: Normal respiratory effort  SKIN: No evident rashes  ABDOMEN: Soft, non-tender, non-distended  GENITOURINARY: Normal phallus, orthotopic meatus, normal bilateral testicles     Assessment & Plan:   Molina Davila is a 19 year old otherwise healthy male who presents for evaluation of pain with voiding and perineal pain with LUTS. Now found to have abnormal bladder mucosa sp TURBT on 1/9/24; path with cystitis glandularis.      # We again discussed the patient's history and his diagnosis of cystitis cystica and cystitis glandularis in detail.  This is usually related to chronic inflammation.  We dicsussed that recent data supports this not being a pre-malignant lesion but this is controversial (https://www.ncbi.nlm.nih.gov/pmc/articles/ZNH1113862/).   Other literature supports follow up (https://www.auajournals.org/doi/10.1097/JU.6605490992695598.03).   We think that this was related to his poor water intake and significant constipation.  He has since seen GI and pelvic floor physical therapy.  GI had recommended a bowel cleanout after his last x-ray 2 months ago showed significant bowel stool content, he did not follow-up with this.  I will send a message to  to again initiate this.  Patient is in agreement with plan.  PFPT has helped his symptoms a lot, he no longer has any significant concerns regarding pelvic pain or urinary symptoms.  We discussed that there are no strict guidelines for follow-up for cystitis glandularis, however I did recommend a cystoscopy to ensure no recurrence of his lesions (see above data).    He refused this today.   He is willing to try it in a couple months.  We will schedule.  He should continue follow-up with dermatology for his rashes.  It appears this may have been related to eczema.  He should continue doing his home pelvic floor exercises.  Continue follow-up with GI for constipation. I will reach out to GI as well.   Continue aggressive water intake.    UA today with protein, small ketones-- I talked to patient and he will see his PCP about this soon- I will also fax a copy to Dr. Webber today     RTC 2mo for cystosocpy       In total, 30 minutes were spent on this patient encounter (including chart review, patient history, physical, and counseling, documentation, and communication).    Talha Khan MD  Staff Urologist  [facility]  Office: [phone]

## 2024-04-29 ENCOUNTER — TELEPHONE (OUTPATIENT)
Dept: SURGERY | Facility: CLINIC | Age: 19
End: 2024-04-29

## 2024-06-20 ENCOUNTER — TELEPHONE (OUTPATIENT)
Facility: CLINIC | Age: 19
End: 2024-06-20

## 2024-06-20 NOTE — TELEPHONE ENCOUNTER
I spoke to the patient, overall feeling better.    He uses miralax as needed    Having more good days than bad. Denies abdominal bloating/discomfort.    Will follow up in GI clinic as needed.

## 2024-07-11 ENCOUNTER — TELEPHONE (OUTPATIENT)
Dept: SURGERY | Facility: CLINIC | Age: 19
End: 2024-07-11

## (undated) DEVICE — BAG DRNGE 2000ML URIN INF CTRL ANTI REFLX

## (undated) DEVICE — SOLUTION IRRIG 3000ML 0.9% NACL FLX CONT

## (undated) DEVICE — CATHETER URETH 18FR BLLN 5CC SIL ALLY W/ SIL

## (undated) DEVICE — TUR/ENDOSCOPIC CABLE, 10' (3.05 M): Brand: CONMED

## (undated) DEVICE — STERILE POLYISOPRENE POWDER-FREE SURGICAL GLOVES: Brand: PROTEXIS

## (undated) DEVICE — CATHETER URETH 22FR BLLN 30CC SIL ELASTMR

## (undated) DEVICE — BAG URINE 4 LITER 600909

## (undated) DEVICE — CHEMOTHERAPY CONTAINER,SLIDE LID, YELLOW: Brand: SHARPSAFETY

## (undated) DEVICE — SOLUTION IRRIG 1000ML ST H2O AQUALITE PLAS

## (undated) DEVICE — CATHETER URETHRAL 20FR BALLOON 5CC SIL HYDROGEL 2 W

## (undated) DEVICE — CYSTO CDS-LF: Brand: MEDLINE INDUSTRIES, INC.

## (undated) DEVICE — Device

## (undated) DEVICE — SLEEVE COMPR M KNEE LEN SGL USE KENDALL SCD

## (undated) DEVICE — SYRINGE,TOOMEY,IRRIGATION,70CC,STERILE: Brand: MEDLINE

## (undated) DEVICE — SYRINGE MED 30ML STD CLR PLAS LL TIP N CTRL

## (undated) NOTE — LETTER
Patient Name: Molina Davila  YOB: 2005          MRN number:  [id number]  Date:  4/17/2024  Referring Physician:  Talha Khan    Discharge Summary  Initial Functional Outcome Score ***/100  Final Functional Outcome Score ***/100  Number of Visits Attended *** in {THERAPY LIST:4309}    Dear Dr. Khan,           21st Century Cures Act Notice to Patient: Medical documents like this are made available to patients in the interest of transparency. However, be advised this is a medical document and it is intended as [id number] communication between your medical providers. This medical document may contain abbreviations, assessments, medical data, and results or other terms that are unfamiliar. Medical documents are intended to carry relevant information, facts as evident, and the clinical opinion of the practitioner. As such, this medical document may be written in language that appears blunt or direct. You are encouraged to contact your medical provider and/or [facility] Patient Experience if you have any questions about this medical document.

## (undated) NOTE — Clinical Note
Please send a copy of my last note and patients last urine analysis to patients PCP Dr. Webber.  Thanks SD